# Patient Record
Sex: FEMALE | Race: WHITE | NOT HISPANIC OR LATINO | Employment: FULL TIME | URBAN - METROPOLITAN AREA
[De-identification: names, ages, dates, MRNs, and addresses within clinical notes are randomized per-mention and may not be internally consistent; named-entity substitution may affect disease eponyms.]

---

## 2017-03-13 ENCOUNTER — PATIENT MESSAGE (OUTPATIENT)
Dept: MEDICAL GROUP | Facility: CLINIC | Age: 27
End: 2017-03-13

## 2017-03-13 DIAGNOSIS — L70.8 OTHER ACNE: ICD-10-CM

## 2017-05-08 ENCOUNTER — RX ONLY (OUTPATIENT)
Age: 27
Setting detail: RX ONLY
End: 2017-05-08

## 2017-05-08 PROBLEM — L70.9 ACNE, UNSPECIFIED: Status: ACTIVE | Noted: 2017-05-08

## 2017-11-11 ENCOUNTER — APPOINTMENT (OUTPATIENT)
Dept: RADIOLOGY | Facility: IMAGING CENTER | Age: 27
End: 2017-11-11
Attending: PHYSICIAN ASSISTANT
Payer: COMMERCIAL

## 2017-11-11 ENCOUNTER — OFFICE VISIT (OUTPATIENT)
Dept: URGENT CARE | Facility: CLINIC | Age: 27
End: 2017-11-11
Payer: COMMERCIAL

## 2017-11-11 VITALS
HEART RATE: 65 BPM | OXYGEN SATURATION: 98 % | HEIGHT: 66 IN | RESPIRATION RATE: 18 BRPM | SYSTOLIC BLOOD PRESSURE: 115 MMHG | TEMPERATURE: 98.5 F | BODY MASS INDEX: 23.3 KG/M2 | DIASTOLIC BLOOD PRESSURE: 75 MMHG | WEIGHT: 145 LBS

## 2017-11-11 DIAGNOSIS — S99.922A INJURY OF TOE ON LEFT FOOT, INITIAL ENCOUNTER: ICD-10-CM

## 2017-11-11 PROCEDURE — 73630 X-RAY EXAM OF FOOT: CPT | Mod: TC,LT | Performed by: PHYSICIAN ASSISTANT

## 2017-11-11 PROCEDURE — 99214 OFFICE O/P EST MOD 30 MIN: CPT | Performed by: PHYSICIAN ASSISTANT

## 2017-11-11 ASSESSMENT — ENCOUNTER SYMPTOMS
JOINT SWELLING: 1
NUMBNESS: 0
FOCAL WEAKNESS: 1
WEAKNESS: 1
SENSORY CHANGE: 0

## 2017-11-11 NOTE — PROGRESS NOTES
"Subjective:      Fouzia Saldana is a 27 y.o. female who presents with Other (stubbed left 5th toe last night. has a cut on the tip. brusied can't apply pressure to it. )            Other   This is a new problem. The current episode started yesterday (5th toe inj). The problem occurs constantly. The problem has been unchanged. Associated symptoms include joint swelling and weakness. Pertinent negatives include no numbness. The symptoms are aggravated by bending and walking. She has tried rest for the symptoms. The treatment provided mild relief.       Review of Systems   Musculoskeletal: Positive for joint pain and joint swelling.   Skin: Negative.    Neurological: Positive for focal weakness and weakness. Negative for sensory change and numbness.          Objective:     /75   Pulse 65   Temp 36.9 °C (98.5 °F)   Resp 18   Ht 1.676 m (5' 6\")   Wt 65.8 kg (145 lb)   SpO2 98%   BMI 23.40 kg/m²      Physical Exam   Constitutional: She is oriented to person, place, and time. She appears well-developed and well-nourished. No distress.   Musculoskeletal: She exhibits edema (5th toe tend/swell) and tenderness. She exhibits no deformity.   Neurological: She is alert and oriented to person, place, and time. No sensory deficit. Gait abnormal.   Skin: Skin is warm and dry. No erythema.   Psychiatric: She has a normal mood and affect. Her behavior is normal. Judgment and thought content normal.   Nursing note and vitals reviewed.    Vitals:    11/11/17 1102   BP: 115/75   Pulse: 65   Resp: 18   Temp: 36.9 °C (98.5 °F)   SpO2: 98%   Weight: 65.8 kg (145 lb)   Height: 1.676 m (5' 6\")     Active Ambulatory Problems     Diagnosis Date Noted   • Acne 12/27/2013   • IUD (intrauterine device) in place-MIRENA 03/10/2016     Resolved Ambulatory Problems     Diagnosis Date Noted   • No Resolved Ambulatory Problems     Past Medical History:   Diagnosis Date   • Depression    • Hypertension    • Migraine    • NEGATIVE HISTORY " OF      Current Outpatient Prescriptions on File Prior to Visit   Medication Sig Dispense Refill   • Diclofenac Sodium 1 % Gel Apply  to skin as directed.     • Multiple Vitamin (MULTI-VITAMIN DAILY PO) Take  by mouth.     • levonorgestrel (MIRENA) 20 MCG/24HR IUD 1 Each by Intrauterine route Once.       No current facility-administered medications on file prior to visit.      Gargles, Cepacol lozenges, Aleve/Advil as needed for throat pain  Family History   Problem Relation Age of Onset   • Cancer Maternal Grandmother 75     bone ca   • Lung Disease Maternal Grandmother      copd/smoker   • Cancer Paternal Grandfather      thinks colon ca   • Thyroid Mother      hypo   • Hyperlipidemia Mother    • Hypertension Mother    • Lung Disease Maternal Grandfather      emyphsema/copd.smoker   • Lung Disease Paternal Grandmother      asthma   • Heart Attack Paternal Grandmother      minor silent MI     Review of patient's allergies indicates no known allergies.         Xr= faint steven.dist.phal (read/interpret. By me. Rw)       Assessment/Plan:     ·  5th toe inj      · RICE, nsaids, buddy tape

## 2018-04-24 ENCOUNTER — OFFICE VISIT (OUTPATIENT)
Dept: MEDICAL GROUP | Facility: CLINIC | Age: 28
End: 2018-04-24
Payer: COMMERCIAL

## 2018-04-24 VITALS
WEIGHT: 147 LBS | TEMPERATURE: 97.3 F | HEART RATE: 78 BPM | RESPIRATION RATE: 14 BRPM | OXYGEN SATURATION: 97 % | SYSTOLIC BLOOD PRESSURE: 120 MMHG | DIASTOLIC BLOOD PRESSURE: 78 MMHG | BODY MASS INDEX: 23.63 KG/M2 | HEIGHT: 66 IN

## 2018-04-24 DIAGNOSIS — M25.842 CYST OF JOINT OF HAND, LEFT: ICD-10-CM

## 2018-04-24 PROCEDURE — 99212 OFFICE O/P EST SF 10 MIN: CPT | Performed by: NURSE PRACTITIONER

## 2018-04-24 ASSESSMENT — PATIENT HEALTH QUESTIONNAIRE - PHQ9: CLINICAL INTERPRETATION OF PHQ2 SCORE: 0

## 2018-04-24 NOTE — PROGRESS NOTES
"CC: Cyst (left hand x couple months; only painful when lifting certain ways)        HPI:     Fouzia presents today for the followin. Cyst of joint of hand, left  Here today to be evaluated for a hard bump just above her left hand metatarsal joint. Present for about 2-3 months. Hasn't really noticed that it's growing or getting bigger. No external skin changes. She states in general it doesn't hurt it doesn't bother her however if she is holding a grocery bag her some benefit but sweet and pressure on the tract area that will be somewhat painful temporarily.  She has full motion of her fingers    Current Outpatient Prescriptions   Medication Sig Dispense Refill   • Diclofenac Sodium 1 % Gel Apply  to skin as directed.     • Multiple Vitamin (MULTI-VITAMIN DAILY PO) Take  by mouth.     • levonorgestrel (MIRENA) 20 MCG/24HR IUD 1 Each by Intrauterine route Once.       No current facility-administered medications for this visit.      Social History   Substance Use Topics   • Smoking status: Never Smoker   • Smokeless tobacco: Never Used   • Alcohol use 4.8 oz/week     8 Shots of liquor per week      Comment: 2-4 drinks 1-2 days/week     I reviewed patients allergies, problem list and medications today in [facility].    ROS: Any/all pertinent positives listed in the HPI, otherwise all others reviewed are negative today.      /78   Pulse 78   Temp 36.3 °C (97.3 °F)   Resp 14   Ht 1.676 m (5' 6\")   Wt 66.7 kg (147 lb)   SpO2 97%   Breastfeeding? No   BMI 23.73 kg/m²     Exam:    Gen: Alert and oriented, No apparent distress. WDWN  Psych: A+Ox3, normal affect and mood  Skin: Warm, dry and intact. Good turgor   No rashes in visible areas.  Eye: Conjunctiva clear, lids normal  ENMT: Lips without lesions, good dentition  Neck: No Lymphadenopathy, Thyromegaly, Bruits.   Trachea midline, no masses  Lungs: Clear to auscultation bilaterally, no rales or rhonchi   Unlabored respiratory effort.   CV: Regular rate and " rhythm, S1, S2. No murmurs.   No Edema   Left hand approximately 2-3 mm firm fairly fixed nodule just distal to the metatarsal joint. No external skin changes. No pain with pressure      Assessment and Plan.   28 y.o. female with the following issues.    1. Cyst of joint of hand, left  Stable. Reassurance. Referral to orthopedics for further evaluation.  - REFERRAL TO ORTHOPEDICS

## 2018-07-10 ENCOUNTER — OFFICE VISIT (OUTPATIENT)
Dept: URGENT CARE | Facility: CLINIC | Age: 28
End: 2018-07-10
Payer: COMMERCIAL

## 2018-07-10 VITALS
HEIGHT: 66 IN | DIASTOLIC BLOOD PRESSURE: 78 MMHG | BODY MASS INDEX: 23.63 KG/M2 | WEIGHT: 147 LBS | RESPIRATION RATE: 16 BRPM | OXYGEN SATURATION: 98 % | SYSTOLIC BLOOD PRESSURE: 110 MMHG | HEART RATE: 74 BPM | TEMPERATURE: 98 F

## 2018-07-10 DIAGNOSIS — J02.9 EXUDATIVE PHARYNGITIS: Primary | ICD-10-CM

## 2018-07-10 LAB
INT CON NEG: NEGATIVE
INT CON POS: POSITIVE
S PYO AG THROAT QL: NORMAL

## 2018-07-10 PROCEDURE — 87880 STREP A ASSAY W/OPTIC: CPT | Performed by: PHYSICIAN ASSISTANT

## 2018-07-10 PROCEDURE — 99214 OFFICE O/P EST MOD 30 MIN: CPT | Performed by: PHYSICIAN ASSISTANT

## 2018-07-10 RX ORDER — AMOXICILLIN AND CLAVULANATE POTASSIUM 875; 125 MG/1; MG/1
1 TABLET, FILM COATED ORAL 2 TIMES DAILY
Qty: 14 TAB | Refills: 0 | Status: SHIPPED | OUTPATIENT
Start: 2018-07-10 | End: 2018-07-17

## 2018-07-10 NOTE — PROGRESS NOTES
Subjective:      Pt is a 28 y.o. female who presents with Pharyngitis (x 2 days, hurt to swallow, lymph nodes swollen, fever, chills)            HPI  PT presents to  clinic today complaining of sore throat,  pressure in ears, cough, fatigue, runny nose. PT denies CP, SOB, NVD, abdominal pain, joint pain. PT states these symptoms began around 2 days ago. PT states the pain is a 7/10 with swallowing, aching in nature and worse at night. . Pt has not taken any RX medications for this condition. The pt's medication list, problem list, and allergies have been evaluated and reviewed during today's visit.      PMH:  Past Medical History:   Diagnosis Date   • Depression     had some depression situationally unemployed, soped, self-resolved   • Hypertension     dentist/doctor appts was told 150s/90s   • Migraine     hx migraines (on imitrex) in her teens, resolved in her 20s   • NEGATIVE HISTORY OF        PSH:  Past Surgical History:   Procedure Laterality Date   • DENTAL EXTRACTION(S)      wisdom       Fam Hx:    family history includes Cancer in her paternal grandfather; Cancer (age of onset: 75) in her maternal grandmother; Heart Attack in her paternal grandmother; Hyperlipidemia in her mother; Hypertension in her mother; Lung Disease in her maternal grandfather, maternal grandmother, and paternal grandmother; Thyroid in her mother.  Family Status   Relation Status   • Maternal Grandmother    • Paternal Grandfather    • Mother Alive   • Maternal Grandfather    • Paternal Grandmother Alive   • Father Alive   • Brother Alive       Soc HX:  Social History     Social History   • Marital status: Single     Spouse name: N/A   • Number of children: N/A   • Years of education: N/A     Occupational History   • Not on file.     Social History Main Topics   • Smoking status: Never Smoker   • Smokeless tobacco: Never Used   • Alcohol use 4.8 oz/week     8 Shots of liquor per week      Comment: 2-4 drinks 1-2  "days/week   • Drug use: No   • Sexual activity: Yes     Partners: Male     Birth control/ protection: IUD      Comment: Mirena-2011     Other Topics Concern   • Not on file     Social History Narrative   • No narrative on file         Medications:    Current Outpatient Prescriptions:   •  amoxicillin-clavulanate (AUGMENTIN) 875-125 MG Tab, Take 1 Tab by mouth 2 times a day for 7 days., Disp: 14 Tab, Rfl: 0  •  Diclofenac Sodium 1 % Gel, Apply  to skin as directed., Disp: , Rfl:   •  Multiple Vitamin (MULTI-VITAMIN DAILY PO), Take  by mouth., Disp: , Rfl:   •  levonorgestrel (MIRENA) 20 MCG/24HR IUD, 1 Each by Intrauterine route Once., Disp: , Rfl:       Allergies:  Patient has no known allergies.    ROS  Constitutional: Positive for  malaise/fatigue.   HENT: Positive for congestion and sore throat. Negative for ear pain.    Eyes: Negative for blurred vision, double vision and photophobia.   Respiratory: Negative for cough, shortness of breath and wheezing.    Cardiovascular: Negative for chest pain and palpitations.   Gastrointestinal: Negative for nausea, vomiting, abdominal pain, diarrhea and constipation.   Genitourinary: Negative for dysuria and flank pain.   Musculoskeletal: Negative for falls and myalgias.   Skin: Negative for itching and rash.   Neurological:  Negative for dizziness and tingling.   Endo/Heme/Allergies: Does not bruise/bleed easily.   Psychiatric/Behavioral: Negative for depression. The patient is not nervous/anxious.             Objective:     /78   Pulse 74   Temp 36.7 °C (98 °F)   Resp 16   Ht 1.676 m (5' 6\")   Wt 66.7 kg (147 lb)   SpO2 98%   BMI 23.73 kg/m²      Physical Exam   Constitutional: PT is oriented to person, place, and time. PT appears well-developed and well-nourished. No distress.   HENT:   Head: Normocephalic and atraumatic.   Right Ear: Hearing, tympanic membrane, external ear and ear canal normal.   Left Ear: Hearing, tympanic membrane, external ear and ear " canal normal.   Nose: Mucosal edema, rhinorrhea and sinus tenderness present. Right sinus exhibits frontal sinus tenderness. Left sinus exhibits frontal sinus tenderness.   Mouth/Throat: Uvula is midline. Mucous membranes are pale. Posterior oropharyngeal edema and posterior oropharyngeal erythema with exudate noted on exam.   Eyes: Conjunctivae normal and EOM are normal. Pupils are equal, round, and reactive to light.   Neck: Normal range of motion. Neck supple. No thyromegaly present.   Cardiovascular: Normal rate, regular rhythm, normal heart sounds and intact distal pulses.  Exam reveals no gallop and no friction rub.    No murmur heard.  Pulmonary/Chest: Effort normal and breath sounds normal. No respiratory distress. PT has no wheezes. PT has no rales. PT exhibits no tenderness.   Abdominal: Soft. Bowel sounds are normal. PT exhibits no distension and no mass. There is no tenderness. There is no rebound and no guarding.   Musculoskeletal: Normal range of motion. PT exhibits no edema and no tenderness.   Lymphadenopathy:     PT has no cervical adenopathy.   Neurological: PT is alert and oriented to person, place, and time. PT displays normal reflexes. No cranial nerve deficit. PT exhibits normal muscle tone. Coordination normal.   Skin: Skin is warm and dry. No rash noted. No erythema.   Psychiatric: PT has a normal mood and affect. PT behavior is normal. Judgment and thought content normal.               Assessment/Plan:     1. Exudative pharyngitis  Back-up abx if symptoms do not improve in 2-3 days. PT on clinical presentation has exudate on oropharynx and it's possible beyond the strep A testing that this could be a different type of strep (C/G) or A. haemolyticum     - amoxicillin-clavulanate (AUGMENTIN) 875-125 MG Tab; Take 1 Tab by mouth 2 times a day for 7 days.  Dispense: 14 Tab; Refill: 0  - POCT Rapid Strep A-->NEG    Rest, fluids encouraged.  OTC decongestant for congestion  AVS with medical info  given.  Pt was in full understanding and agreement with the plan.  Follow-up as needed if symptoms worsen or fail to improve.

## 2019-02-08 ENCOUNTER — OFFICE VISIT (OUTPATIENT)
Dept: MEDICAL GROUP | Facility: PHYSICIAN GROUP | Age: 29
End: 2019-02-08
Payer: COMMERCIAL

## 2019-02-08 VITALS
DIASTOLIC BLOOD PRESSURE: 64 MMHG | RESPIRATION RATE: 14 BRPM | WEIGHT: 150 LBS | BODY MASS INDEX: 24.11 KG/M2 | HEIGHT: 66 IN | OXYGEN SATURATION: 96 % | HEART RATE: 62 BPM | SYSTOLIC BLOOD PRESSURE: 110 MMHG | TEMPERATURE: 98.1 F

## 2019-02-08 DIAGNOSIS — F32.9 REACTIVE DEPRESSION (SITUATIONAL): ICD-10-CM

## 2019-02-08 PROCEDURE — 99214 OFFICE O/P EST MOD 30 MIN: CPT | Performed by: FAMILY MEDICINE

## 2019-02-08 ASSESSMENT — PATIENT HEALTH QUESTIONNAIRE - PHQ9
SUM OF ALL RESPONSES TO PHQ QUESTIONS 1-9: 9
5. POOR APPETITE OR OVEREATING: 0 - NOT AT ALL
CLINICAL INTERPRETATION OF PHQ2 SCORE: 3

## 2019-02-09 NOTE — PROGRESS NOTES
"Fouzia Saldana is a 28 y.o. female here to establish care and discuss depression.    HPI:  Fouzia is a pleasant 28-year-old female here to establish care.  Her previous PCP was ROB Morocho.  She is an  and works for the city of [place].  Her  (Huy) is also a patient of mine.    Her main concern today is depression.  She recently found out that her  has been abusing narcotic pain medication.  He has been buying it from an acquaintance.  He is in a methadone program now.  When I ask her how she is doing, she tells me, \"I'm not doing well.\"  She recently established with a therapist who recommended seeing a physician to discuss going on an anti-depressant.  She denies issues sleeping or eating.  Work has been stressful as well with an influx of cases.  We discussed her depression screen.  She has felt depressed in the past, while being unemployed.  Has never been on medication.  Denies suicidal or homicidal thoughts.    Depression Screening  Little interest or pleasure in doing things?  0 - not at all   Feeling down, depressed , or hopeless? 3 - nearly every day   Trouble falling or staying asleep, or sleeping too much?  1 - several days   Feeling tired or having little energy?  1 - several days   Poor appetite or overeating?  0 - not at all   Feeling bad about yourself - or that you are a failure or have let yourself or your family down? 2 - more than half the days   Trouble concentrating on things, such as reading the newspaper or watching television? 2 - more than half the days   Moving or speaking so slowly that other people could have noticed.  Or the opposite - being so fidgety or restless that you have been moving around a lot more than usual?  0 - not at all   Thoughts that you would be better off dead, or of hurting yourself?  0 - not at all   Patient Health Questionnaire Score: 9     Current medicines (including changes today)  Current Outpatient Prescriptions   Medication " Sig Dispense Refill   • sertraline (ZOLOFT) 50 MG Tab Take 1/2 tablet PO daily x2 weeks, then increase to 1 tablet PO daily. 30 Tab 2   • Multiple Vitamin (MULTI-VITAMIN DAILY PO) Take  by mouth.     • levonorgestrel (MIRENA) 20 MCG/24HR IUD 1 Each by Intrauterine route Once.     • Diclofenac Sodium 1 % Gel Apply  to skin as directed.       No current facility-administered medications for this visit.      She  has a past medical history of Depression; Hypertension; and Migraine.  She  has a past surgical history that includes dental extraction(s).  Social History   Substance Use Topics   • Smoking status: Never Smoker   • Smokeless tobacco: Never Used   • Alcohol use Yes     Social History     Social History Narrative   • No narrative on file     Family History   Problem Relation Age of Onset   • Cancer Maternal Grandmother 75        bone ca   • Lung Disease Maternal Grandmother         copd/smoker   • Cancer Paternal Grandfather         thinks colon ca   • Thyroid Mother         hypo   • Hyperlipidemia Mother    • Hypertension Mother    • Lung Disease Maternal Grandfather         emyphsema/copd.smoker   • Lung Disease Paternal Grandmother         asthma   • Heart Attack Paternal Grandmother         minor silent MI     Family Status   Relation Status   • MGMo    • PGFa    • Mo Alive   • MGFa    • PGMo Alive   • Fa Alive   • Bro Alive     ROS  Constitutional: Negative for fever, chills and malaise/fatigue.   HENT: Negative for congestion.    Eyes: Negative for pain.   Respiratory: Negative for cough and shortness of breath.    Cardiovascular: Negative for leg swelling.   Gastrointestinal: Negative for nausea, vomiting, abdominal pain and diarrhea.   Genitourinary: Negative for dysuria and hematuria.   Skin: Negative for rash.   Neurological: Negative for dizziness, focal weakness and headaches.   Endo/Heme/Allergies: Does not bruise/bleed easily.   Psychiatric/Behavioral: Positive for  "depression.      Objective:     Physical Exam:  Blood pressure 110/64, pulse 62, temperature 36.7 °C (98.1 °F), resp. rate 14, height 1.676 m (5' 6\"), weight 68 kg (150 lb), SpO2 96 %, not currently breastfeeding. Body mass index is 24.21 kg/m².  Constitutional: Alert, no distress.  Skin: Warm, dry, good turgor, no rashes in visible areas.  Eye: Equal, round and reactive, conjunctiva clear, lids normal.  ENMT: Lips without lesions, good dentition, moist mucous membranes.  Neck: Trachea midline, no masses, no thyromegaly.  Respiratory: Unlabored respiratory effort, no cough.  MSK: Normal gait, moves all extremities.  Psych: Alert and oriented x3, normal affect and mood. Intermittently teary-eyed during her appointment.    Assessment and Plan:     1. Reactive depression (situational)  This is a new problem.  Significant increase in stress both at home and at work.  Her  is in treatment for Opioid Use Disorder.  We had a lengthy discussion regarding treatment options for depression.  Counseling provided.  We agreed on a trial of low-dose sertraline.  Possible adverse effects discussed.  She will have close follow-up.  - sertraline (ZOLOFT) 50 MG Tab; Take 1/2 tablet PO daily x2 weeks, then increase to 1 tablet PO daily.  Dispense: 30 Tab; Refill: 2    Records reviewed in Epic  Followup: 4-6 weeks, sooner if needed         PLEASE NOTE: This dictation was created using voice recognition software. I have made every reasonable attempt to correct obvious errors, but I expect that there are errors of grammar and possibly content that I did not discover before finalizing the note.  "

## 2019-03-20 ENCOUNTER — OFFICE VISIT (OUTPATIENT)
Dept: MEDICAL GROUP | Facility: PHYSICIAN GROUP | Age: 29
End: 2019-03-20
Payer: COMMERCIAL

## 2019-03-20 VITALS
TEMPERATURE: 97.8 F | BODY MASS INDEX: 24.43 KG/M2 | HEART RATE: 72 BPM | OXYGEN SATURATION: 93 % | RESPIRATION RATE: 12 BRPM | HEIGHT: 66 IN | WEIGHT: 152 LBS

## 2019-03-20 DIAGNOSIS — F32.9 REACTIVE DEPRESSION (SITUATIONAL): ICD-10-CM

## 2019-03-20 PROCEDURE — 99213 OFFICE O/P EST LOW 20 MIN: CPT | Performed by: FAMILY MEDICINE

## 2019-03-20 ASSESSMENT — PATIENT HEALTH QUESTIONNAIRE - PHQ9
CLINICAL INTERPRETATION OF PHQ2 SCORE: 2
5. POOR APPETITE OR OVEREATING: 0 - NOT AT ALL
SUM OF ALL RESPONSES TO PHQ QUESTIONS 1-9: 8

## 2019-03-20 NOTE — PROGRESS NOTES
Subjective:   Fouzia Saldana is a 28 y.o. female here today for follow-up depression. She is unaccompanied for today's appointment.     Reactive depression  Patient tells me that she is feeling better. She has been treating her depression with Zoloft. She recently increased her dose to 50 mg daily. Her depression stems from her husbands abuse of narcotic pain medication and other stressors in her life. She continues to meet with her therapist. Her work schedule has slowed down recently and she feels more in control of her depression. She is sleeping well recently but is having some difficulty waking in the morning. She was feeling tired in the afternoon so she switched her Zoloft dose to just before bed. She is eating normally and has been working on taking time for herself. She is complaining of an associated acne exacerbation which generally happens with stress and she has used topical medications in the past.    Her , Huy, has relapsed on narcotics twice since the last office visit but is continuing to follow up with [facility] for treatment. He is considering starting Suboxone. He will be telling about his family about his addiction problems this week.     Current medicines (including changes today)  Current Outpatient Prescriptions   Medication Sig Dispense Refill   • sertraline (ZOLOFT) 50 MG Tab Take 1/2 tablet PO daily x2 weeks, then increase to 1 tablet PO daily. 30 Tab 2   • Multiple Vitamin (MULTI-VITAMIN DAILY PO) Take  by mouth.     • levonorgestrel (MIRENA) 20 MCG/24HR IUD 1 Each by Intrauterine route Once.     • Diclofenac Sodium 1 % Gel Apply  to skin as directed.       No current facility-administered medications for this visit.      She  has a past medical history of Depression; Hypertension; and Migraine.    ROS   No chest pain, no shortness of breath, no abdominal pain.     Objective:     Physical Exam:  Pulse 72, temperature 36.6 °C (97.8 °F), resp. rate 12, height 1.676 m  "(5' 6\"), weight 68.9 kg (152 lb), SpO2 93 %, not currently breastfeeding. Body mass index is 24.53 kg/m².   Constitutional: Alert, no distress, well-groomed.  Skin: Warm, dry, good turgor, no rashes in visible areas.  Eye: Equal, round and reactive, conjunctiva clear, lids normal.  ENMT: Lips without lesions, good dentition, moist mucous membranes.  Neck: Trachea midline, no masses, no thyromegaly.  Respiratory: Unlabored respiratory effort, no cough.  Abdomen: Soft, no gross masses.  MSK: Normal gait, moves all extremities.  Neuro: Grossly non-focal. No cranial nerve deficit. Strength and sensation intact.   Psych: Alert and oriented x3, normal affect and mood.    Assessment and Plan:     1. Reactive depression (situational)  Improving. Patient reports moderate improvement since increasing her Zoloft dose to 50 mg daily. She is making advances towards controlling her depression and discussed possibly lowering her dose in the future if she feels comfortable. Discussed the importance of taking time for herself while being supportive of her . Encouraged her to continue therapy. Will continue to follow up and she will follow up in 10 weeks.     Followup: Return in about 10 weeks (around 5/29/2019) for f/u sertraline medication, short.          Willem ARDON (Scribe), am scribing for, and in the presence of, Teresita Brown MD    Electronically signed by: Willem Quintanilla (Scribe), 3/20/2019    Teresita ARODN MD personally performed the services described in this documentation, as scribed by Willem Quintanilla in my presence, and it is both accurate and complete.    "

## 2019-05-13 DIAGNOSIS — F32.9 REACTIVE DEPRESSION (SITUATIONAL): ICD-10-CM

## 2019-05-29 ENCOUNTER — APPOINTMENT (OUTPATIENT)
Dept: MEDICAL GROUP | Facility: PHYSICIAN GROUP | Age: 29
End: 2019-05-29
Payer: COMMERCIAL

## 2019-08-16 ENCOUNTER — OFFICE VISIT (OUTPATIENT)
Dept: MEDICAL GROUP | Facility: PHYSICIAN GROUP | Age: 29
End: 2019-08-16
Payer: COMMERCIAL

## 2019-08-16 VITALS
WEIGHT: 151 LBS | BODY MASS INDEX: 24.27 KG/M2 | TEMPERATURE: 97.9 F | OXYGEN SATURATION: 98 % | RESPIRATION RATE: 18 BRPM | HEIGHT: 66 IN | DIASTOLIC BLOOD PRESSURE: 82 MMHG | SYSTOLIC BLOOD PRESSURE: 122 MMHG | HEART RATE: 80 BPM

## 2019-08-16 DIAGNOSIS — M76.31 IT BAND SYNDROME, RIGHT: ICD-10-CM

## 2019-08-16 DIAGNOSIS — F32.9 REACTIVE DEPRESSION: ICD-10-CM

## 2019-08-16 DIAGNOSIS — F32.1 CURRENT MODERATE EPISODE OF MAJOR DEPRESSIVE DISORDER WITHOUT PRIOR EPISODE (HCC): ICD-10-CM

## 2019-08-16 DIAGNOSIS — M70.61 TROCHANTERIC BURSITIS OF RIGHT HIP: ICD-10-CM

## 2019-08-16 PROCEDURE — 99214 OFFICE O/P EST MOD 30 MIN: CPT | Performed by: FAMILY MEDICINE

## 2019-08-16 SDOH — HEALTH STABILITY: MENTAL HEALTH: HOW MANY STANDARD DRINKS CONTAINING ALCOHOL DO YOU HAVE ON A TYPICAL DAY?: 3 OR 4

## 2019-08-16 SDOH — HEALTH STABILITY: MENTAL HEALTH: HOW OFTEN DO YOU HAVE 6 OR MORE DRINKS ON ONE OCCASION?: MONTHLY

## 2019-08-16 SDOH — HEALTH STABILITY: MENTAL HEALTH: HOW OFTEN DO YOU HAVE A DRINK CONTAINING ALCOHOL?: 2-3 TIMES A WEEK

## 2019-08-16 NOTE — PROGRESS NOTES
"Subjective:   Fouzia Saldana is a 29 y.o. female here today for follow-up depression.  She is unaccompanied for today's visit.     Fouzia tells me that she is feeling \"okay\" today.  She admits that she has had some periods of \"lows\" a few weeks ago.  During this time, she did have passive suicidal thoughts.  She denies any active SI or HI today.  She continues to be supportive of her  who is going through treatment for opioid use disorder.  She has noticed that when he has a relapse, she feels down.  The sertraline medication has been very helpful for her and she would like to continue it.  She is meeting regularly with her counselor.  She tells me that she has not been good about exercising or spending time with friends as she worries about her .    She also mentions that she has been having some stiffness in and around her right hip.  This is new to me and has been ongoing for the last several weeks.  No injury or trauma.  She has been less physically active than she usually is.  Today, she mainly has stiffness in her right quadriceps muscle.  No issues walking.  No associated numbness, tingling or muscle weakness.    Current medicines (including changes today)  Current Outpatient Medications   Medication Sig Dispense Refill   • sertraline (ZOLOFT) 50 MG Tab TAKE 1/2 TABLET BY MOUTH ONCE DAILY FOR 2 WEEKS, THEN INCREASE TO 1 TABLET ONCE DAILY 90 Tab 1   • Diclofenac Sodium 1 % Gel Apply  to skin as directed.     • Multiple Vitamin (MULTI-VITAMIN DAILY PO) Take  by mouth.     • levonorgestrel (MIRENA) 20 MCG/24HR IUD 1 Each by Intrauterine route Once.       No current facility-administered medications for this visit.      She  has a past medical history of Depression, Hypertension, and Migraine.    ROS   No fever, no chest pain, no shortness of breath, no abdominal pain. No SI/HI.     Objective:     Physical Exam:  /82 (BP Location: Left arm, Patient Position: Sitting, BP Cuff Size: Adult)   " "Pulse 80   Temp 36.6 °C (97.9 °F) (Temporal)   Resp 18   Ht 1.676 m (5' 6\")   Wt 68.5 kg (151 lb)   SpO2 98%  Body mass index is 24.37 kg/m².   Constitutional: Alert, no distress, well-groomed.  Skin: Warm, dry, good turgor, no rashes in visible areas.  Eye: Equal, round and reactive, conjunctiva clear, lids normal.  ENMT: Lips without lesions, good dentition, moist mucous membranes.  Neck: Trachea midline, no masses, no thyromegaly.  Respiratory: Unlabored respiratory effort, no cough.  Abdomen: Soft, no gross masses.  MSK: Normal gait, moves all extremities.   Hip: Tenderness to palpation along proximal right quadriceps muscle, right IT band and right greater trochanteric bursitis. Normal flexion, extension, abduction and adduction ROM. No pain with axial load or internal rotation. Negative FADDIR. Negative SLR.  Neuro: Grossly non-focal. No cranial nerve deficit. Strength and sensation intact.   Psych: Alert and oriented x3, slightly dull affect.    Assessment and Plan:     1. Reactive depression  2. Current moderate episode of major depressive disorder without prior episode (HCC)  Relatively stable, but with some acute exacerbations of depressed mood and passive suicidal ideations.  She denies crisis today.  There is a significant amount of stress at home.  Encouraged her to continue seeing her counselor and set aside time for herself.  We agreed to continue her current dose of sertraline and meet again in three months.    3. It band syndrome, right  4. Trochanteric bursitis of right hip  New.  Differentials include quadriceps strain, IT band syndrome and trochanteric bursitis.  Home exercises and NSAIDs discussed.  Return precautions given.  May consider referral to Sports Medicine if worsening.    Followup: Return in about 3 months (around 11/16/2019) for f/u depression, short.          Jason ARDON (Scribe), am scribing for, and in the presence of, Teresita Brown MD    Electronically signed by: " Jason Finch (Scribe), 8/16/2019    ITeresita MD personally performed the services described in this documentation, as scribed by Jason Finch in my presence, and it is both accurate and complete.

## 2019-11-18 ENCOUNTER — OFFICE VISIT (OUTPATIENT)
Dept: MEDICAL GROUP | Facility: PHYSICIAN GROUP | Age: 29
End: 2019-11-18
Payer: COMMERCIAL

## 2019-11-18 VITALS
TEMPERATURE: 98.6 F | SYSTOLIC BLOOD PRESSURE: 108 MMHG | WEIGHT: 152.4 LBS | HEART RATE: 66 BPM | DIASTOLIC BLOOD PRESSURE: 74 MMHG | HEIGHT: 66 IN | OXYGEN SATURATION: 98 % | RESPIRATION RATE: 16 BRPM | BODY MASS INDEX: 24.49 KG/M2

## 2019-11-18 DIAGNOSIS — F32.9 REACTIVE DEPRESSION (SITUATIONAL): ICD-10-CM

## 2019-11-18 PROCEDURE — 99214 OFFICE O/P EST MOD 30 MIN: CPT | Performed by: FAMILY MEDICINE

## 2019-11-19 NOTE — PROGRESS NOTES
"Subjective:   Fouzia Saldana is a 29 y.o. female here today for follow-up depression.  She is unaccompanied for today's appointment today.    Fouzia tells me that she is doing \"okay.\"  She continues to meet with her therapist and take her sertraline medication.  She denies missed days or adverse effects.  She feels well on it and would like to continue it for the next few months.  She mentions that her  is doing well in his treatment program.  He did unfortunately have a relapse and overdosed at home.  Fouzia needed to administer Narcan nasal spray to him.  Since that time, he has been very motivated at his program and has been attending daily therapy sessions.  Work is going well and she denies any new stressors.  She is excited for the holidays.  Sleeping well and taking time for herself.    Current medicines (including changes today)  Current Outpatient Medications   Medication Sig Dispense Refill   • sertraline (ZOLOFT) 50 MG Tab Take 1 Tab by mouth every day. 90 Tab 1   • Diclofenac Sodium 1 % Gel Apply  to skin as directed.     • Multiple Vitamin (MULTI-VITAMIN DAILY PO) Take  by mouth.     • levonorgestrel (MIRENA) 20 MCG/24HR IUD 1 Each by Intrauterine route Once.       No current facility-administered medications for this visit.      She  has a past medical history of Depression, Hypertension, and Migraine.    ROS   No fatigue, no fever, no shortness of breath, no abdominal pain, no suicidal thoughts.     Objective:     Physical Exam:  /74 (BP Location: Left arm, Patient Position: Sitting, BP Cuff Size: Adult)   Pulse 66   Temp 37 °C (98.6 °F) (Temporal)   Resp 16   Ht 1.676 m (5' 6\")   Wt 69.1 kg (152 lb 6.4 oz)   SpO2 98%  Body mass index is 24.6 kg/m².   Constitutional: Alert, no distress, well-groomed.  Skin: Warm, dry, good turgor, no rashes in visible areas.  Eye: Equal, round and reactive, conjunctiva clear, lids normal.  ENMT: Lips without lesions, good dentition, moist mucous " membranes.  Neck: Trachea midline, no masses, no thyromegaly.  Respiratory: Unlabored respiratory effort, no cough.  Abdomen: Soft, no gross masses.  MSK: Normal gait, moves all extremities.  Neuro: Grossly non-focal. No cranial nerve deficit. Strength and sensation intact.   Psych: Alert and oriented x3, normal affect and mood.    Assessment and Plan:     1. Reactive depression (situational)  Patient is feeling well on current medications.  Will continue and refills ordered.  Denies any suicidal or homicidal ideation.  Emphasized importance of healthy diet and exercise.  Discussed that should the patient have any symptoms they should call suicide prevention hotline or report to the emergency room immediately.    She will return next year for annual exam.  We will re-evaluate her anti-depressant at that time.  I let her know she may send me a [employer] message at anytime should she have questions or wish to taper off sooner.    - sertraline (ZOLOFT) 50 MG Tab; Take 1 Tab by mouth every day.  Dispense: 90 Tab; Refill: 1    Followup: Return in about 6 months (around 5/18/2020) for Annual, PAP, check in on medicine, short.         PLEASE NOTE: This dictation was created using voice recognition software. I have made every reasonable attempt to correct obvious errors, but I expect that there are errors of grammar and possibly content that I did not discover before finalizing the note.

## 2020-03-02 ENCOUNTER — OFFICE VISIT (OUTPATIENT)
Dept: MEDICAL GROUP | Facility: PHYSICIAN GROUP | Age: 30
End: 2020-03-02
Payer: COMMERCIAL

## 2020-03-02 VITALS
HEART RATE: 70 BPM | DIASTOLIC BLOOD PRESSURE: 66 MMHG | OXYGEN SATURATION: 94 % | SYSTOLIC BLOOD PRESSURE: 126 MMHG | HEIGHT: 66 IN | WEIGHT: 150 LBS | BODY MASS INDEX: 24.11 KG/M2 | RESPIRATION RATE: 16 BRPM | TEMPERATURE: 98.8 F

## 2020-03-02 DIAGNOSIS — F32.1 CURRENT MODERATE EPISODE OF MAJOR DEPRESSIVE DISORDER WITHOUT PRIOR EPISODE (HCC): ICD-10-CM

## 2020-03-02 PROCEDURE — 99214 OFFICE O/P EST MOD 30 MIN: CPT | Performed by: FAMILY MEDICINE

## 2020-03-02 RX ORDER — SERTRALINE HYDROCHLORIDE 100 MG/1
75 TABLET, FILM COATED ORAL DAILY
Qty: 1 TAB | Refills: 0
Start: 2020-03-02 | End: 2020-04-30

## 2020-03-03 NOTE — PROGRESS NOTES
"Subjective:   Fouzia Saldana is a 29 y.o. female here today for follow-up depression and medication management.  She is accompanied by her , Huy, for today's appointment.    Over the last several months, Fouzia reports that she has had periods of time where she feels very \"down\" and \"sad.\"  Her  describes her crying more and not wanting to spend time with friends and family.  These episodes will last for a few days and then she will be back to her normal self.  She has been on sertraline 50mg for several months.  This was first started around the time her  began treatment for opioid use disorder.  She has felt well on the medication, but wonders if it is no longer working as well as it used to do.  Denies adverse effects.  No issues sleeping.  Appetite is good. Denies suicidal or homicidal thoughts. No lio.    Current medicines (including changes today)  Current Outpatient Medications   Medication Sig Dispense Refill   • sertraline (ZOLOFT) 100 MG Tab Take 1 Tab by mouth every day. 1 Tab 0   • Diclofenac Sodium 1 % Gel Apply  to skin as directed.     • Multiple Vitamin (MULTI-VITAMIN DAILY PO) Take  by mouth.     • levonorgestrel (MIRENA) 20 MCG/24HR IUD 1 Each by Intrauterine route Once.       No current facility-administered medications for this visit.      She  has a past medical history of Depression, Hypertension, and Migraine.    ROS   No chest pain, no shortness of breath, no abdominal pain.     Objective:     Physical Exam:  /66 (BP Location: Right arm, Patient Position: Sitting, BP Cuff Size: Adult)   Pulse 70   Temp 37.1 °C (98.8 °F) (Temporal)   Resp 16   Ht 1.676 m (5' 6\")   Wt 68 kg (150 lb)   SpO2 94%  Body mass index is 24.21 kg/m².   Constitutional: Alert, no distress, well-groomed.  Skin: Warm, dry, good turgor, no rashes in visible areas.  Eye: Equal, round and reactive, conjunctiva clear, lids normal.  ENMT: Lips without lesions, good dentition, moist mucous " membranes.  Neck: Trachea midline, no masses, no thyromegaly.  Respiratory: Unlabored respiratory effort, no cough.  Abdomen: Soft, no gross masses.  MSK: Normal gait, moves all extremities.  Neuro: Grossly non-focal. No cranial nerve deficit. Strength and sensation intact.   Psych: Alert and oriented x3, normal affect and mood.    Assessment and Plan:     1. Current moderate episode of major depressive disorder without prior episode (HCC)  Worsening.  We discussed that her clinical presentation is now more consistent with Major Depressive Disorder now.  Symptoms are no longer well controlled with sertraline 50mg.  Will increase dose to 75mg.  May consider increasing further to 100mg if needed.  Encouraged her to consider increasing frequency of therapy appointments in the short-term.  Denies crisis.  Will monitor closely.  - sertraline (ZOLOFT) 100 MG Tab; Take 3/4 Tab by mouth every day.  Dispense: 1 Tab; Refill: 0    Followup: 6 weeks         PLEASE NOTE: This dictation was created using voice recognition software. I have made every reasonable attempt to correct obvious errors, but I expect that there are errors of grammar and possibly content that I did not discover before finalizing the note.

## 2020-04-30 ENCOUNTER — PATIENT MESSAGE (OUTPATIENT)
Dept: MEDICAL GROUP | Facility: PHYSICIAN GROUP | Age: 30
End: 2020-04-30

## 2020-04-30 RX ORDER — SERTRALINE HYDROCHLORIDE 25 MG/1
25 TABLET, FILM COATED ORAL DAILY
Qty: 30 TAB | Refills: 11 | Status: SHIPPED | OUTPATIENT
Start: 2020-04-30 | End: 2020-07-08 | Stop reason: SDUPTHER

## 2020-07-08 ENCOUNTER — HOSPITAL ENCOUNTER (OUTPATIENT)
Facility: MEDICAL CENTER | Age: 30
End: 2020-07-08
Attending: FAMILY MEDICINE
Payer: COMMERCIAL

## 2020-07-08 ENCOUNTER — OFFICE VISIT (OUTPATIENT)
Dept: MEDICAL GROUP | Facility: PHYSICIAN GROUP | Age: 30
End: 2020-07-08
Payer: COMMERCIAL

## 2020-07-08 VITALS
HEIGHT: 66 IN | BODY MASS INDEX: 23.78 KG/M2 | RESPIRATION RATE: 16 BRPM | TEMPERATURE: 98 F | DIASTOLIC BLOOD PRESSURE: 78 MMHG | HEART RATE: 72 BPM | WEIGHT: 148 LBS | SYSTOLIC BLOOD PRESSURE: 118 MMHG | OXYGEN SATURATION: 98 %

## 2020-07-08 DIAGNOSIS — Z12.4 CERVICAL CANCER SCREENING: ICD-10-CM

## 2020-07-08 DIAGNOSIS — F32.5 MAJOR DEPRESSIVE DISORDER WITH SINGLE EPISODE, IN FULL REMISSION (HCC): ICD-10-CM

## 2020-07-08 DIAGNOSIS — Z11.51 SCREENING FOR HPV (HUMAN PAPILLOMAVIRUS): ICD-10-CM

## 2020-07-08 DIAGNOSIS — Z01.419 WELL WOMAN EXAM: ICD-10-CM

## 2020-07-08 PROCEDURE — 99395 PREV VISIT EST AGE 18-39: CPT | Performed by: FAMILY MEDICINE

## 2020-07-08 PROCEDURE — 88175 CYTOPATH C/V AUTO FLUID REDO: CPT

## 2020-07-08 PROCEDURE — 87624 HPV HI-RISK TYP POOLED RSLT: CPT

## 2020-07-08 RX ORDER — SERTRALINE HYDROCHLORIDE 25 MG/1
25 TABLET, FILM COATED ORAL DAILY
Qty: 90 TAB | Refills: 3 | Status: SHIPPED | OUTPATIENT
Start: 2020-07-08 | End: 2021-09-01 | Stop reason: SDUPTHER

## 2020-07-08 NOTE — PROGRESS NOTES
Subjective:     CC:   Chief Complaint   Patient presents with   • Annual Exam     PAP     HPI:   Fouzia Saldana is a 30 y.o. female who presents for annual exam.    Patient has GYN provider: No   Last Pap Smear: [id number]   H/O Abnormal Pap: Yes, 10 years ago  Last Mammogram: N/A  Last Bone Density Test: N/A  Last Colorectal Cancer Screening: N/A  Last Tdap: 2016  Received HPV series: Patient does not recall    Exercise: strenuous regular exercise, aerobic > 3 hours a week  Diet: healthy      No LMP recorded. Patient has had an implant.  Hx STDs: No  Birth control: Mirena IUD  Menses every month with 1-2 days with spotting bleeding.  Denies cramping.  No significant bloating/fluid retention, pelvic pain, or dyspareunia. No abnormal vaginal discharge.  No breast tenderness, mass, nipple discharge, changes in size or contour, or abnormal cyclic discomfort.    OB History    Para Term  AB Living   0 0 0 0 0 0   SAB TAB Ectopic Molar Multiple Live Births   0 0 0 0 0 0      She  reports being sexually active and has had partner(s) who are Male. She reports using the following method of birth control/protection: I.U.D..    She  has a past medical history of Depression, Hypertension, and Migraine.  She  has a past surgical history that includes dental extraction(s).    Family History   Problem Relation Age of Onset   • Cancer Maternal Grandmother 75        bone ca   • Lung Disease Maternal Grandmother         copd/smoker   • Cancer Paternal Grandfather         thinks colon ca   • Thyroid Mother         hypo   • Hyperlipidemia Mother    • Hypertension Mother    • Lung Disease Maternal Grandfather         emyphsema/copd.smoker   • Lung Disease Paternal Grandmother         asthma   • Heart Attack Paternal Grandmother         minor silent MI     Social History     Tobacco Use   • Smoking status: Never Smoker   • Smokeless tobacco: Never Used   Substance Use Topics   • Alcohol use: Yes     Frequency: 2-3 times a  "week     Drinks per session: 3 or 4     Binge frequency: Monthly   • Drug use: No       Patient Active Problem List    Diagnosis Date Noted   • Current moderate episode of major depressive disorder without prior episode (HCC) 08/16/2019   • Acne 12/27/2013     Current Outpatient Medications   Medication Sig Dispense Refill   • sertraline (ZOLOFT) 50 MG Tab Take 1 Tab by mouth every day. 30 Tab 11   • sertraline (ZOLOFT) 25 MG tablet Take 1 Tab by mouth every day. 90 Tab 3   • Diclofenac Sodium 1 % Gel Apply  to skin as directed.     • Multiple Vitamin (MULTI-VITAMIN DAILY PO) Take  by mouth.     • levonorgestrel (MIRENA) 20 MCG/24HR IUD 1 Each by Intrauterine route Once.       No current facility-administered medications for this visit.      No Known Allergies    Review of Systems   Constitutional: Negative for fever, chills and malaise/fatigue.   HENT: Negative for congestion. She mentions that she does feel like she has noticed more of a hearing issue with people wearing masks. Although it has not changed.   Eyes: Negative for pain.   Respiratory: Negative for cough and shortness of breath.    Cardiovascular: Negative for chest pain and leg swelling.   Gastrointestinal: Negative for nausea, vomiting, abdominal pain and diarrhea.   Genitourinary: Negative for dysuria and hematuria.   Skin: Negative for rash.   Neurological: Negative for dizziness, focal weakness and headaches.   Endo/Heme/Allergies: Does not bruise/bleed easily.   Psychiatric/Behavioral: Negative for depression.  The patient is not nervous/anxious.      Objective:   /78   Pulse 72   Temp 36.7 °C (98 °F)   Resp 16   Ht 1.676 m (5' 6\")   Wt 67.1 kg (148 lb)   SpO2 98%   BMI 23.89 kg/m²     Wt Readings from Last 4 Encounters:   07/08/20 67.1 kg (148 lb)   03/02/20 68 kg (150 lb)   11/18/19 69.1 kg (152 lb 6.4 oz)   08/16/19 68.5 kg (151 lb)     A chaperone was offered to the patient during today's exam. Patient declined " chaperone.    Physical Exam:  Constitutional: Well-developed and well-nourished. Not diaphoretic. No distress.   Skin: Skin is warm and dry. No rash noted.  Head: Atraumatic without lesions.  Eyes: Conjunctivae and extraocular motions are normal. Pupils are equal, round, and reactive to light. No scleral icterus.   Ears:  External ears unremarkable. Tympanic membranes clear and intact.  Nose: Nares patent. Septum midline. Turbinates without erythema nor edema. No discharge.   Mouth/Throat: Tongue normal. Oropharynx is clear and moist. Posterior pharynx without erythema or exudates.  Neck: Supple, trachea midline. Normal range of motion. No thyromegaly present. No lymphadenopathy--cervical or supraclavicular.  Cardiovascular: Regular rate and rhythm, S1 and S2 without murmur, rubs, or gallops.    Breast: Breasts examined seated and supine. No skin changes, peau d'orange or nipple retraction. No discharge. No axillary or supraclavicular adenopathy. No masses or nodularity palpable.  Abdomen: Soft, non tender, and without distention. Active bowel sounds in all four quadrants. No rebound, guarding, masses or HSM.  : Perineum and external genitalia normal without rash. Vagina with normal and physiologic discharge. Cervix without visible lesions or discharge. +IUD strings visible.  Extremities: No cyanosis, clubbing, erythema, nor edema. Distal pulses intact and symmetric.   Musculoskeletal: All major joints AROM full in all directions without pain.  Neurological: Alert and oriented x 3. Grossly non-focal. Strength and sensation grossly intact.   Psychiatric:  Behavior, mood, and affect are appropriate.    Assessment and Plan:     1. Well woman exam  2. Cervical cancer screening  3. Screening for HPV (human papillomavirus)  This is a healthy 30-year-old female here today for a preventative exam.  Previous medical history, healthcare maintenance and immunization status reviewed.  Patient is up to date.  See discussion of  anticipatory guidance below.  Patient will return annually for preventative exams.  - THINPREP PAP WITH HPV; Future    4. Major depressive disorder with single episode, in full remission (HCC)  Mood stable.  Denies crisis.  Will continue with current dose of 75mg, sent in 90-day supplies.  - sertraline (ZOLOFT) 50 MG Tab; Take 1 Tab by mouth every day.  Dispense: 30 Tab; Refill: 11  - sertraline (ZOLOFT) 25 MG tablet; Take 1 Tab by mouth every day.  Dispense: 90 Tab; Refill: 3    Health maintenance:  UTD   Labs per orders  Immunizations per orders  Patient counseled about skin care, diet, supplements, and exercise.  Discussed  family planning choices, diet and exercise, healthy alcohol consumption     Follow-up: Return in about 1 year (around 7/8/2021) for Annual.

## 2020-07-09 LAB
CYTOLOGY REG CYTOL: NORMAL
HPV HR 12 DNA CVX QL NAA+PROBE: NEGATIVE
HPV16 DNA SPEC QL NAA+PROBE: NEGATIVE
HPV18 DNA SPEC QL NAA+PROBE: NEGATIVE
SPECIMEN SOURCE: NORMAL

## 2020-10-27 ENCOUNTER — IMMUNIZATION (OUTPATIENT)
Dept: SOCIAL WORK | Facility: CLINIC | Age: 30
End: 2020-10-27
Payer: COMMERCIAL

## 2020-10-27 DIAGNOSIS — Z23 NEED FOR VACCINATION: Primary | ICD-10-CM

## 2021-03-18 PROCEDURE — 90686 IIV4 VACC NO PRSV 0.5 ML IM: CPT | Performed by: REGISTERED NURSE

## 2021-03-18 PROCEDURE — 90471 IMMUNIZATION ADMIN: CPT | Performed by: REGISTERED NURSE

## 2021-07-15 ENCOUNTER — APPOINTMENT (OUTPATIENT)
Dept: MEDICAL GROUP | Facility: PHYSICIAN GROUP | Age: 31
End: 2021-07-15
Payer: COMMERCIAL

## 2021-07-28 ENCOUNTER — OFFICE VISIT (OUTPATIENT)
Dept: MEDICAL GROUP | Facility: PHYSICIAN GROUP | Age: 31
End: 2021-07-28
Payer: COMMERCIAL

## 2021-07-28 ENCOUNTER — TELEPHONE (OUTPATIENT)
Dept: MEDICAL GROUP | Facility: PHYSICIAN GROUP | Age: 31
End: 2021-07-28

## 2021-07-28 VITALS
SYSTOLIC BLOOD PRESSURE: 116 MMHG | RESPIRATION RATE: 20 BRPM | TEMPERATURE: 98.1 F | OXYGEN SATURATION: 96 % | BODY MASS INDEX: 24.68 KG/M2 | HEIGHT: 66 IN | HEART RATE: 76 BPM | DIASTOLIC BLOOD PRESSURE: 82 MMHG | WEIGHT: 153.6 LBS

## 2021-07-28 DIAGNOSIS — F33.42 RECURRENT MAJOR DEPRESSIVE DISORDER, IN FULL REMISSION (HCC): ICD-10-CM

## 2021-07-28 DIAGNOSIS — R87.619 ATYPICAL ENDOCERVICAL CELLS ON CERVICAL PAPANICOLAOU SMEAR: ICD-10-CM

## 2021-07-28 DIAGNOSIS — M25.561 CHRONIC PAIN OF RIGHT KNEE: ICD-10-CM

## 2021-07-28 DIAGNOSIS — G89.29 CHRONIC PAIN OF RIGHT KNEE: ICD-10-CM

## 2021-07-28 PROCEDURE — 99214 OFFICE O/P EST MOD 30 MIN: CPT | Performed by: PHYSICIAN ASSISTANT

## 2021-07-28 ASSESSMENT — PATIENT HEALTH QUESTIONNAIRE - PHQ9
4. FEELING TIRED OR HAVING LITTLE ENERGY: SEVERAL DAYS
8. MOVING OR SPEAKING SO SLOWLY THAT OTHER PEOPLE COULD HAVE NOTICED. OR THE OPPOSITE, BEING SO FIGETY OR RESTLESS THAT YOU HAVE BEEN MOVING AROUND A LOT MORE THAN USUAL: NOT AT ALL
9. THOUGHTS THAT YOU WOULD BE BETTER OFF DEAD, OR OF HURTING YOURSELF: NOT AT ALL
CLINICAL INTERPRETATION OF PHQ2 SCORE: 0
1. LITTLE INTEREST OR PLEASURE IN DOING THINGS: NOT AT ALL
3. TROUBLE FALLING OR STAYING ASLEEP OR SLEEPING TOO MUCH: SEVERAL DAYS
2. FEELING DOWN, DEPRESSED, IRRITABLE, OR HOPELESS: NOT AT ALL
5. POOR APPETITE OR OVEREATING: NOT AT ALL
5. POOR APPETITE OR OVEREATING: 0 - NOT AT ALL
SUM OF ALL RESPONSES TO PHQ9 QUESTIONS 1 AND 2: 0
6. FEELING BAD ABOUT YOURSELF - OR THAT YOU ARE A FAILURE OR HAVE LET YOURSELF OR YOUR FAMILY DOWN: NOT AL ALL
SUM OF ALL RESPONSES TO PHQ QUESTIONS 1-9: 2
7. TROUBLE CONCENTRATING ON THINGS, SUCH AS READING THE NEWSPAPER OR WATCHING TELEVISION: NOT AT ALL

## 2021-07-28 NOTE — PROGRESS NOTES
Chief Complaint   Patient presents with   • Establish Care   • Abnormal Pap Smear     about 1 yr ago   • IUD Removal       HISTORY OF PRESENT ILLNESS: Fouzia Saldana is an established 31 y.o. female here to discuss the evaluation and management of:    Recurrent major depressive disorder, in full remission (HCC)  Chronic but stable problem.  Patient takes Zoloft 75 mg once daily.  Denies side effects or complications of medication.  States depression is managed on this regimen.  Patient exercises regularly and exercise routine consist of cardiovascular.  She tells me that she walks 1 mile per day and rides her bike 30 minutes 5 days a week.  Patient is following up with therapist once a month.  States her diet is healthy.  Denies homicidal or suicidal ideation.  She would like to continue Zoloft as a treatment option.    Depression Screening    Little interest or pleasure in doing things?  0 - not at all  Feeling down, depressed , or hopeless? 0 - not at all  Patient Health Questionnaire Score: 0    If depressive symptoms identified deferred to follow up visit unless specifically addressed in assesment and plan.      Interpretation of PHQ-9 Total Score   Score Severity   1-4 Minimal Depression   5-9 Mild Depression   10-14 Moderate Depression   15-19 Moderately Severe Depression   20-27 Severe Depression        Chronic pain of right knee  Chronic problem.  Stable.  Patient states she has an old right knee injury and occasionally experiences an intermittent aching pain and uses topical Voltaren gel with resolution of symptoms.  This works well for her.   Does not need refills at this time.  Denies muscle weakness, muscle atrophy, neuropathy, swelling, erythema, warmth.  Denies gait abnormalities.      Atypical endocervical cells on cervical Papanicolaou smear  Pap smear from 7/8/2020 indicated atypical squamous cell.  Patient was advised to repeat Pap in 6 months.  She tells me that she was unable to get her Pap  done and would like to get repeat Pap as well as IUD removed.  Patient states she is had her IUD for 5 years.  Patient is asymptomatic.        Patient Active Problem List    Diagnosis Date Noted   • Atypical endocervical cells on cervical Papanicolaou smear 2021   • Chronic pain of right knee 2021   • Current moderate episode of major depressive disorder without prior episode (HCC) 2019   • Acne 2013       Allergies:Patient has no known allergies.    Current Outpatient Medications   Medication Sig Dispense Refill   • sertraline (ZOLOFT) 50 MG Tab Take 1 Tab by mouth every day. 30 Tab 11   • sertraline (ZOLOFT) 25 MG tablet Take 1 Tab by mouth every day. 90 Tab 3   • Diclofenac Sodium 1 % Gel Apply  to skin as directed.     • Multiple Vitamin (MULTI-VITAMIN DAILY PO) Take  by mouth.     • levonorgestrel (MIRENA) 20 MCG/24HR IUD 1 Each by Intrauterine route Once.       No current facility-administered medications for this visit.       Social History     Tobacco Use   • Smoking status: Never Smoker   • Smokeless tobacco: Never Used   Vaping Use   • Vaping Use: Never used   Substance Use Topics   • Alcohol use: Yes     Comment: 5 days a week 2-3 beer per time.    • Drug use: No       Family Status   Relation Name Status   • MGMo     • PGFa     • Mo  Alive   • MGFa     • PGMo  Alive   • Fa  Alive   • Bro older Alive     Family History   Problem Relation Age of Onset   • Cancer Maternal Grandmother 75        bone ca   • Lung Disease Maternal Grandmother         copd/smoker   • Cancer Paternal Grandfather         thinks colon ca   • Thyroid Mother         hypo   • Hyperlipidemia Mother    • Hypertension Mother    • Lung Disease Maternal Grandfather         emyphsema/copd.smoker   • Lung Disease Paternal Grandmother         asthma   • Heart Attack Paternal Grandmother         minor silent MI       ROS:  Review of Systems   Constitutional: Negative for fever, chills, weight  "loss and malaise/fatigue.   HENT: Negative for ear pain, nosebleeds, congestion, sore throat and neck pain.    Eyes: Negative for blurred vision.   Respiratory: Negative for cough, sputum production, shortness of breath and wheezing.    Cardiovascular: Negative for chest pain, palpitations, orthopnea and leg swelling.   Gastrointestinal: Negative for heartburn, nausea, vomiting and abdominal pain.   Genitourinary: Negative for dysuria, urgency and frequency.   Musculoskeletal: Negative for myalgias, back pain and joint pain.   Skin: Negative for rash and itching.   Neurological: Negative for dizziness, tingling, tremors, sensory change, focal weakness and headaches.   Endo/Heme/Allergies: Does not bruise/bleed easily.   Psychiatric/Behavioral: Negative for depression, suicidal ideas and memory loss.  The patient is not nervous/anxious and does not have insomnia.    All other systems reviewed and are negative except as in HPI.    Exam: /82 (BP Location: Left arm, Patient Position: Sitting, BP Cuff Size: Adult)   Pulse 76   Temp 36.7 °C (98.1 °F) (Temporal)   Resp 20   Ht 1.676 m (5' 6\")   Wt 69.7 kg (153 lb 9.6 oz)   SpO2 96%  Body mass index is 24.79 kg/m².  General: Normal appearing. No distress.  HEENT: Normocephalic. Eyes conjunctiva clear lids without ptosis, ears normal shape and contour.  Neck: Supple without JVD. Thyroid is not enlarged.  Pulmonary: Clear to ausculation.  Normal effort. No rales, ronchi, or wheezing.  Cardiovascular: Regular rate and rhythm without murmur.   Abdomen: Nondistended.   Neurologic: Grossly nonfocal.  Cranial nerves are normal.  Skin: Warm and dry.  No rashes or suspicious skin lesions.  Musculoskeletal: Normal gait. No extremity cyanosis, clubbing, or edema.  Psych: Normal mood and affect. Alert and oriented x3. Judgment and insight is normal.    Medical decision-making and discussion:  1. Recurrent major depressive disorder, in full remission (HCC)  Chronic but " stable problem.  I will manage Zoloft medication for patient.  Patient does not need a Zoloft refill at this time but when patient needs a refill I will refill medication for pt.   Patient is feeling well on current medications. Will continue. Denies any suicidal or homicidal ideation. Emphasized importance of healthy diet and exercise. Discussed that should the patient have any symptoms they should call suicide prevention hotline or report to the emergency room immediately.      2. Chronic pain of right knee  Chronic but stable problem.  Advised patient to continue Voltaren gel as needed.  Continue living an active lifestyle.  Continue to monitor.    3. Atypical endocervical cells on cervical Papanicolaou smear  Reviewed patient's Pap smear results from 7/20/2020.  Positive for atypical squamous cells.  Patient is asymptomatic.  Discussed with patient I will speak to Dr. Hernández to see if she can be placed on her schedule in the near future for Pap smear and IUD removal and replacement.  Patient will be contacted with this information once I obtain it.  If Dr. Hernández or another provider in the office is able to see patient in the near future discussed with patient I will refer her to gynecology.  Patient agreed to plan.        Please note that this dictation was created using voice recognition software. I have made every reasonable attempt to correct obvious errors, but I expect that there are errors of grammar and possibly content that I did not discover before finalizing the note.    Assessment/Plan:  1. Recurrent major depressive disorder, in full remission (HCC)     2. Chronic pain of right knee     3. Atypical endocervical cells on cervical Papanicolaou smear         Return if symptoms worsen or fail to improve.

## 2021-07-28 NOTE — TELEPHONE ENCOUNTER
Phone Number Called: [phone] (home)     Call outcome: Did not leave a detailed message. Requested patient to call back.    Message: LVM for the Pt to cb and schedule appt with PCP for Pap and paperwork to order Mirena IUD.

## 2021-07-29 ENCOUNTER — TELEPHONE (OUTPATIENT)
Dept: MEDICAL GROUP | Facility: PHYSICIAN GROUP | Age: 31
End: 2021-07-29

## 2021-07-29 NOTE — TELEPHONE ENCOUNTER
Phone Number Called: [phone] (home)     Call outcome: Did not leave a detailed message. Requested patient to call back.    Message: LVM for Pt to cb and schedule an appt for Pap and IUD Paperwork.  2nd call.

## 2021-07-30 NOTE — TELEPHONE ENCOUNTER
VOICEMAIL  1. Caller Name: Fouzia Saldana         Call Back Number: [phone]      2. Message: Patient LVM wanting to set up annual appt with follow up and Pap    3. Patient approves office to leave a detailed voicemail/MyChart message: N\A

## 2021-07-30 NOTE — TELEPHONE ENCOUNTER
Phone Number Called: [phone]      Call outcome: Spoke to patient regarding message below.    Message: Patient has schheduled appt for 08/17/2021 for Anca Santos and Paperwork for IUD.

## 2021-09-01 ENCOUNTER — OFFICE VISIT (OUTPATIENT)
Dept: MEDICAL GROUP | Facility: PHYSICIAN GROUP | Age: 31
End: 2021-09-01
Payer: COMMERCIAL

## 2021-09-01 ENCOUNTER — HOSPITAL ENCOUNTER (OUTPATIENT)
Facility: MEDICAL CENTER | Age: 31
End: 2021-09-01
Attending: PHYSICIAN ASSISTANT
Payer: COMMERCIAL

## 2021-09-01 VITALS
WEIGHT: 154 LBS | BODY MASS INDEX: 24.75 KG/M2 | DIASTOLIC BLOOD PRESSURE: 86 MMHG | HEART RATE: 76 BPM | HEIGHT: 66 IN | OXYGEN SATURATION: 93 % | RESPIRATION RATE: 12 BRPM | SYSTOLIC BLOOD PRESSURE: 110 MMHG | TEMPERATURE: 98.2 F

## 2021-09-01 DIAGNOSIS — Z11.51 SCREENING FOR HPV (HUMAN PAPILLOMAVIRUS): ICD-10-CM

## 2021-09-01 DIAGNOSIS — R87.610 ATYPICAL SQUAMOUS CELLS OF UNDETERMINED SIGNIFICANCE (ASC-US) ON CERVICAL PAP SMEAR: ICD-10-CM

## 2021-09-01 DIAGNOSIS — Z01.419 WOMEN'S ANNUAL ROUTINE GYNECOLOGICAL EXAMINATION: ICD-10-CM

## 2021-09-01 DIAGNOSIS — F32.5 MAJOR DEPRESSIVE DISORDER WITH SINGLE EPISODE, IN FULL REMISSION (HCC): ICD-10-CM

## 2021-09-01 PROCEDURE — 88175 CYTOPATH C/V AUTO FLUID REDO: CPT

## 2021-09-01 PROCEDURE — 99395 PREV VISIT EST AGE 18-39: CPT | Performed by: PHYSICIAN ASSISTANT

## 2021-09-01 PROCEDURE — 99000 SPECIMEN HANDLING OFFICE-LAB: CPT | Performed by: PHYSICIAN ASSISTANT

## 2021-09-01 PROCEDURE — 87624 HPV HI-RISK TYP POOLED RSLT: CPT

## 2021-09-01 RX ORDER — SERTRALINE HYDROCHLORIDE 25 MG/1
25 TABLET, FILM COATED ORAL DAILY
Qty: 90 TABLET | Refills: 3 | Status: SHIPPED | OUTPATIENT
Start: 2021-09-01 | End: 2021-12-02 | Stop reason: SDUPTHER

## 2021-09-01 NOTE — PROGRESS NOTES
SUBJECTIVE: 31 y.o. female for annual routine gynecologic exam  Chief Complaint   Patient presents with   • Gynecologic Exam   • Paperwork     IUD order   • Medication Refill     Sertraline     Patient has a positive medical history for depression.  Depression is managed with 75 mg Zoloft once daily.  She denies side effects or complications from medication.  This regimen works well for her.  Patient is requesting refill.  She lives an active lifestyle and her diet is healthy.  Denies homicidal or suicidal ideation.      OB History    Para Term  AB Living   0 0 0 0 0 0   SAB TAB Ectopic Molar Multiple Live Births   0 0 0 0 0 0      Last Pap: 7/10/2020  Social History     Substance and Sexual Activity   Sexual Activity Yes   • Partners: Male   • Birth control/protection: I.U.D.    Comment: . weamadeo: assistant       Sexual history: currently sexually active, single partner, heterosexual, contraceptive--IUD   H/O Abnormal Pap yes on 7/10/2020. Pap was + for ATYPICAL SQUAMOUS CELLS OF UNDETERMINED SIGNIFICANCE and advised to f/u in 6 months for repeat pap. Was unable to f/u due to Covid and busy schedule.   She  reports that she has never smoked. She has never used smokeless tobacco.        Allergies: Patient has no known allergies.     ROS:    Reports no menopause symptoms of hot flashes, night sweats, sleep disruption, mood changes.Denies vaginal dryness.   Patient has a Mirena IUD and intermittently will experience spotting but does not always have a menses.  Cramping is no.   She does not take OTC analgesics for cramping  No significant bloating/fluid retention, pelvic pain, or dyspareunia. No vaginal discharge   No breast tenderness, mass, nipple discharge, changes in size or contour, or abnormal cyclic discomfort.  No urinary tract symptoms, no incontinence, no polydipsia, polyuria,  No abdominal pain, change in bowel habits, black or bloody stools.    No unusual headaches, no visual  "changes, menstrual migraines   No prolonged cough. No dyspnea or chest pain on exertion.  No depression, labile mood, anxiety, libido changes, insomnia.  No temperature intolerance.  No new/concerning skin lesions, concerns.     Exercise: moderate regular exercise program. Exercise routine consist of running 1 mile 7 days a week and biking 5 days a week 30  Minutes per time.   Preventive Care: Pt. Does not do self breast exams. Pt. Take an OTC multi-vitamin once daily.     Current medicines (including changes today)  Current Outpatient Medications   Medication Sig Dispense Refill   • sertraline (ZOLOFT) 25 MG tablet Take 1 Tablet by mouth every day. 90 Tablet 3   • sertraline (ZOLOFT) 50 MG Tab Take 1 Tablet by mouth every day. 30 Tablet 11   • Diclofenac Sodium 1 % Gel Apply  to skin as directed.     • Multiple Vitamin (MULTI-VITAMIN DAILY PO) Take  by mouth.     • levonorgestrel (MIRENA) 20 MCG/24HR IUD 1 Each by Intrauterine route Once.       No current facility-administered medications for this visit.     She  has a past medical history of Depression, Hypertension, and Migraine.  She  has a past surgical history that includes dental extraction(s).     Family History:   Family History   Problem Relation Age of Onset   • Cancer Maternal Grandmother 75        bone ca   • Lung Disease Maternal Grandmother         copd/smoker   • Cancer Paternal Grandfather         thinks colon ca   • Thyroid Mother         hypo   • Hyperlipidemia Mother    • Hypertension Mother    • Lung Disease Maternal Grandfather         emyphsema/copd.smoker   • Lung Disease Paternal Grandmother         asthma   • Heart Attack Paternal Grandmother         minor silent MI       OBJECTIVE:   /86 (BP Location: Right arm, Patient Position: Sitting, BP Cuff Size: Adult)   Pulse 76   Temp 36.8 °C (98.2 °F) (Temporal)   Resp 12   Ht 1.676 m (5' 6\")   Wt 69.9 kg (154 lb)   SpO2 93%   BMI 24.86 kg/m²   Body mass index is 24.86 " "kg/m².    Exam: /86 (BP Location: Right arm, Patient Position: Sitting, BP Cuff Size: Adult)   Pulse 76   Temp 36.8 °C (98.2 °F) (Temporal)   Resp 12   Ht 1.676 m (5' 6\")   Wt 69.9 kg (154 lb)   SpO2 93%   General: Normal appearing. No distress.  HEENT: Normocephalic. Eyes conjunctiva clear lids without ptosis, pupils equal and reactive to light accommodation, ears normal shape and contour, canals are clear bilaterally, tympanic membranes are benign, nasal mucosa benign, oropharynx is without erythema, edema or exudates.   Neck: Supple without JVD or bruit. Thyroid is not enlarged.  Pulmonary: Clear to ausculation.  Normal effort. No rales, ronchi, or wheezing.  Cardiovascular: Regular rate and rhythm without murmur. Carotid and radial pulses are intact and equal bilaterally.  Abdomen: Soft, nontender, nondistended. Normal bowel sounds. Liver and spleen are not palpable  Neurologic: Grossly nonfocal.  Cranial nerves are normal.  Lymph: No cervical, supraclavicular or axillary lymph nodes are palpable  Skin: Warm and dry.  No rashes or suspicious skin lesions.  Musculoskeletal: Normal gait. No extremity cyanosis, clubbing, or edema.  Psych: Normal mood and affect. Alert and oriented x3. Judgment and insight is normal.     Breast Exam: Performed with instruction during examination. No axillary lymphadenopathy, no skin changes, no dominant masses. No nipple retraction  Pelvic Exam -  Normal external genitalia with no lesions. Vaginal Mucosa:  normal vaginal mucosa . Cervix with no visible lesions.  Positive for IUD strings.  No cervical motion tenderness. Uterus is normal sized with no masses. No adnexal tenderness or enlargement appreciated. Thin Prep Pap is obtained, vaginal swab is obtained and specimen(s) sent to lab  Rectal: deferred    <ASSESSMENT and PLAN>  1. Women's annual routine gynecological examination   Thin Prep Pap is obtained, vaginal swab is obtained and specimen(s) sent to lab     During " today's appointment paperwork has been completed to order a replacement Mirena IUD.  Patient is past due for her IUD.  She tells me Julia IUD was placed in July 2016.  Dr. Hernández has agreed to remove current IUD and replace IUD.  Once we receive the IUD, patient will be contacted to be scheduled for removal and replacement.  Patient agreed to plan.    - THINPREP PAP WITH HPV; Future    2. Atypical squamous cells of undetermined significance (ASC-US) on cervical Pap smear   Thin Prep Pap is obtained, vaginal swab is obtained and specimen(s) sent to lab      - THINPREP PAP WITH HPV; Future    3. Screening for HPV (human papillomavirus)   Thin Prep Pap is obtained, vaginal swab is obtained and specimen(s) sent to lab    - THINPREP PAP WITH HPV; Future    4. Major depressive disorder with single episode, in full remission (HCC)  Patient is feeling well on current medications. Will continue. Denies any suicidal or homicidal ideation. Emphasized importance of healthy diet and exercise. Discussed that should the patient have any symptoms they should call suicide prevention hotline or report to the emergency room immediately.    - sertraline (ZOLOFT) 25 MG tablet; Take 1 Tablet by mouth every day.  Dispense: 90 Tablet; Refill: 3  - sertraline (ZOLOFT) 50 MG Tab; Take 1 Tablet by mouth every day.  Dispense: 30 Tablet; Refill: 11    1. Women's annual routine gynecological examination  THINPREP PAP WITH HPV   2. Atypical squamous cells of undetermined significance (ASC-US) on cervical Pap smear  THINPREP PAP WITH HPV   3. Screening for HPV (human papillomavirus)  THINPREP PAP WITH HPV   4. Major depressive disorder with single episode, in full remission (HCC)  sertraline (ZOLOFT) 25 MG tablet    sertraline (ZOLOFT) 50 MG Tab       Discussed  breast self exam, mammography screening, feminine hygiene, use and side effects of IUD, adequate intake of calcium and vitamin D, diet and exercise   Follow-up in 3 years for next Gyn  exam and 3 years for next Pap.   Next office visit for recheck of chronic medical conditions is due in 1 year for annual.

## 2021-09-02 DIAGNOSIS — Z01.419 WOMEN'S ANNUAL ROUTINE GYNECOLOGICAL EXAMINATION: ICD-10-CM

## 2021-09-02 DIAGNOSIS — Z11.51 SCREENING FOR HPV (HUMAN PAPILLOMAVIRUS): ICD-10-CM

## 2021-09-02 DIAGNOSIS — R87.610 ATYPICAL SQUAMOUS CELLS OF UNDETERMINED SIGNIFICANCE (ASC-US) ON CERVICAL PAP SMEAR: ICD-10-CM

## 2021-12-02 ENCOUNTER — OFFICE VISIT (OUTPATIENT)
Dept: MEDICAL GROUP | Facility: MEDICAL CENTER | Age: 31
End: 2021-12-02
Payer: COMMERCIAL

## 2021-12-02 VITALS
DIASTOLIC BLOOD PRESSURE: 88 MMHG | HEIGHT: 66 IN | WEIGHT: 158.6 LBS | BODY MASS INDEX: 25.49 KG/M2 | SYSTOLIC BLOOD PRESSURE: 128 MMHG | TEMPERATURE: 97.5 F | OXYGEN SATURATION: 96 % | RESPIRATION RATE: 16 BRPM | HEART RATE: 60 BPM

## 2021-12-02 DIAGNOSIS — F32.5 MAJOR DEPRESSIVE DISORDER WITH SINGLE EPISODE, IN FULL REMISSION (HCC): ICD-10-CM

## 2021-12-02 DIAGNOSIS — Z97.5 IUD (INTRAUTERINE DEVICE) IN PLACE: ICD-10-CM

## 2021-12-02 DIAGNOSIS — F32.1 CURRENT MODERATE EPISODE OF MAJOR DEPRESSIVE DISORDER WITHOUT PRIOR EPISODE (HCC): ICD-10-CM

## 2021-12-02 DIAGNOSIS — Z00.00 PREVENTATIVE HEALTH CARE: ICD-10-CM

## 2021-12-02 PROCEDURE — 99213 OFFICE O/P EST LOW 20 MIN: CPT | Performed by: PHYSICIAN ASSISTANT

## 2021-12-02 RX ORDER — SERTRALINE HYDROCHLORIDE 25 MG/1
25 TABLET, FILM COATED ORAL DAILY
Qty: 90 TABLET | Refills: 1 | Status: SHIPPED | OUTPATIENT
Start: 2021-12-02 | End: 2022-07-28

## 2021-12-02 NOTE — PROGRESS NOTES
"Chief Complaint   Patient presents with   • Establish Care       HPI  Fouzia Saldana is a 31 y.o. female here today for establishing care.    Patient has history of depression, has been on Zoloft 75 mg daily for about 2 years and has been doing great on it.  States before medicine she would get depressive symptoms including depressed mood suicidal ideations low interest anhedonia low energy.  Since starting medicine patient states all her symptoms has been improved and patient feels well, no SI HI, no SE such as decreased libido.        Patient currently has a Mirena IUD in place which needs to be replaced and she would like to replace it with another Mirena IUD.        Exam:  /88 (BP Location: Left arm, Patient Position: Sitting)   Pulse 60   Temp 36.4 °C (97.5 °F) (Temporal)   Resp 16   Ht 1.676 m (5' 6\")   Wt 71.9 kg (158 lb 9.6 oz)   SpO2 96%       Constitutional: Alert, oriented in no acute distress.  Psych: Eye contact is good, speech goal directed, affect calm  Eyes: Conjunctiva non-injected, sclera non-icteric.  Lungs: Unlabored respiratory effort, clear to auscultation bilaterally with good excursion, no wheez or rhonci  CV: regular rate and rhythm. No lower extremity edema  Ears:  External ears unremarkable. TMs pearly gray, clear and intact, w/o any perforation or effusion.        A/P:  1. Preventative health care    - CBC WITH DIFFERENTIAL; Future  - Comp Metabolic Panel; Future  - Lipid Profile; Future  - TSH WITH REFLEX TO FT4; Future  - VITAMIN D,25 HYDROXY; Future    2. Current moderate episode of major depressive disorder without prior episode (HCC)      3. IUD (intrauterine device) in place  Loves her IUD without any spotting.  Would like to switch with another Mirena IUD        F/U:  "

## 2021-12-08 ENCOUNTER — OFFICE VISIT (OUTPATIENT)
Dept: MEDICAL GROUP | Facility: MEDICAL CENTER | Age: 31
End: 2021-12-08
Payer: COMMERCIAL

## 2021-12-08 VITALS
WEIGHT: 159 LBS | SYSTOLIC BLOOD PRESSURE: 120 MMHG | BODY MASS INDEX: 25.55 KG/M2 | TEMPERATURE: 97.7 F | HEART RATE: 61 BPM | RESPIRATION RATE: 12 BRPM | HEIGHT: 66 IN | DIASTOLIC BLOOD PRESSURE: 74 MMHG | OXYGEN SATURATION: 94 %

## 2021-12-08 DIAGNOSIS — Z30.433 ENCOUNTER FOR REMOVAL AND REINSERTION OF INTRAUTERINE CONTRACEPTIVE DEVICE (IUD): ICD-10-CM

## 2021-12-08 PROCEDURE — 99213 OFFICE O/P EST LOW 20 MIN: CPT | Performed by: FAMILY MEDICINE

## 2021-12-08 NOTE — PROGRESS NOTES
Subjective:     CC: Discuss IUD removal/reinsertion    HPI:   Fouzia is a pleasant patient of Zoila Allred PA-C who presents to discuss:    Encounter for removal and reinsertion of intrauterine contraceptive device (IUD)  Patient presents to discuss removal and reinsertion of Mirena IUD.  She reports her current Mirena was placed about 5 and half years ago.  She reports this form of contraception has worked well for her and would like a new Mirena placed.  She is sexually monogamous with her , Pap smear completed 9/2021 reviewed cotesting negative/normal.  She denies vaginal discharge, pain, foul odor, pelvic cramping.  She does not have menstrual cycles, occasional spotting.      Past Medical History:   Diagnosis Date   • Depression     had some depression situationally unemployed, soped, self-resolved   • Migraine     hx migraines (on imitrex) in her teens, resolved in her 20s       Social History     Tobacco Use   • Smoking status: Never Smoker   • Smokeless tobacco: Never Used   Vaping Use   • Vaping Use: Never used   Substance Use Topics   • Alcohol use: Yes     Comment: 5 days a week 2-3 beer per time.    • Drug use: No       Current Outpatient Medications Ordered in Epic   Medication Sig Dispense Refill   • levonorgestrel (MIRENA, 52 MG,) 52 mg (20 mcg/24 hr) IUD 1 Intra Uterine Device by Intrauterine route one time for 1 dose. 1 Each 0   • sertraline (ZOLOFT) 25 MG tablet Take 1 Tablet by mouth every day. 90 Tablet 1   • sertraline (ZOLOFT) 50 MG Tab Take 1 Tablet by mouth every day. 30 Tablet 1   • Diclofenac Sodium 1 % Gel Apply  to skin as directed.     • Multiple Vitamin (MULTI-VITAMIN DAILY PO) Take  by mouth.     • levonorgestrel (MIRENA) 20 MCG/24HR IUD 1 Each by Intrauterine route Once.       No current Epic-ordered facility-administered medications on file.       Allergies:  Patient has no known allergies.    ROS:  Gen: no fevers/chills, no changes in weight  Eyes: no changes in  "vision  ENT: no sore throat, no hearing loss, no bloody nose  Pulm: no SOB  CV: no chest pain        Objective:       Exam:  /74 (BP Location: Left arm, Patient Position: Sitting, BP Cuff Size: Adult)   Pulse 61   Temp 36.5 °C (97.7 °F) (Temporal)   Resp 12   Ht 1.676 m (5' 6\")   Wt 72.1 kg (159 lb)   SpO2 94%   BMI 25.66 kg/m²  Body mass index is 25.66 kg/m².    Constitutional: Alert, no distress, well-groomed  Skin: Warm, dry, good turgor, no rashes in visible areas  Eyes: Equal, round and reactive, conjunctiva clear, no ptosis  Respiratory: Unlabored respiratory effort  Psych: Alert and oriented, normal affect and mood    Assessment & Plan:     31 y.o. female with the following -     1. Encounter for removal and reinsertion of intrauterine contraceptive device (IUD)  Discussed risks, benefits, and alternative to Mirena IUD removal and reinsertion; patient verbalized understanding and would like to proceed with Mirena removal and reinsertion. Patient will be scheduled once device received.  - levonorgestrel (MIRENA, 52 MG,) 52 mg (20 mcg/24 hr) IUD; 1 Intra Uterine Device by Intrauterine route one time for 1 dose.  Dispense: 1 Each; Refill: 0    Please note this dictation was created using voice recognition software. I have made every reasonable attempt to correct obvious errors, but I expect there may be errors of grammar, and possibly content, that I did not discover before finalizing the note.       "

## 2021-12-09 ENCOUNTER — PATIENT MESSAGE (OUTPATIENT)
Dept: MEDICAL GROUP | Facility: MEDICAL CENTER | Age: 31
End: 2021-12-09

## 2022-01-15 NOTE — PATIENT COMMUNICATION
Scheduled pt for placement as follow:  Future Appointments       Provider Department Center    2/1/2022 2:30 PM Karrie Arshad M.D. [facility]

## 2022-02-28 ENCOUNTER — OFFICE VISIT (OUTPATIENT)
Dept: MEDICAL GROUP | Facility: MEDICAL CENTER | Age: 32
End: 2022-02-28
Payer: COMMERCIAL

## 2022-02-28 VITALS
TEMPERATURE: 97 F | BODY MASS INDEX: 25.88 KG/M2 | OXYGEN SATURATION: 96 % | HEIGHT: 66 IN | HEART RATE: 81 BPM | SYSTOLIC BLOOD PRESSURE: 122 MMHG | DIASTOLIC BLOOD PRESSURE: 80 MMHG | WEIGHT: 161 LBS

## 2022-02-28 DIAGNOSIS — Z53.8 UNSUCCESSFUL IUD INSERTION: ICD-10-CM

## 2022-02-28 DIAGNOSIS — Z97.5 IUD (INTRAUTERINE DEVICE) IN PLACE: ICD-10-CM

## 2022-02-28 PROCEDURE — 58301 REMOVE INTRAUTERINE DEVICE: CPT | Performed by: FAMILY MEDICINE

## 2022-02-28 PROCEDURE — 99999 PR NO CHARGE: CPT | Performed by: FAMILY MEDICINE

## 2022-02-28 RX ORDER — MISOPROSTOL 200 UG/1
400 TABLET ORAL ONCE
Qty: 2 TABLET | Refills: 0 | Status: SHIPPED | OUTPATIENT
Start: 2022-02-28 | End: 2022-02-28

## 2022-02-28 ASSESSMENT — PATIENT HEALTH QUESTIONNAIRE - PHQ9: CLINICAL INTERPRETATION OF PHQ2 SCORE: 0

## 2022-02-28 NOTE — PROCEDURES
IUD REMOVAL PROCEDURE NOTE:    Indication:  IUD    Speculum placed in the vagina and cervix visualized. IUD strings seen. IUD strings grasped with ring forceps and removed intact. Hemostasis noted. Patient tolerated procedure well without complication.     IUD INSERTION NOTE:    Indication: Long-term birth control    Benefits, alternatives and risks discussed with patient. Consent form signed.    Bimanual exam performed. Uterus is anteverted. Sterile speculum placed with good visualization of the cervix. Cervix cleansed with betadine three times. Anterior lip of cervix grasped with single-tooth tenaculum. Unfortunately, unable to pass sound through internal os. Attempted multiple times. Plan to have patient return, will pre-treat with cytotec.Patient tolerated procedure well without complication. After-care and return instructions given to patient.

## 2022-03-02 ENCOUNTER — HOSPITAL ENCOUNTER (OUTPATIENT)
Facility: MEDICAL CENTER | Age: 32
End: 2022-03-02
Attending: FAMILY MEDICINE
Payer: COMMERCIAL

## 2022-03-02 ENCOUNTER — OFFICE VISIT (OUTPATIENT)
Dept: MEDICAL GROUP | Facility: MEDICAL CENTER | Age: 32
End: 2022-03-02
Payer: COMMERCIAL

## 2022-03-02 VITALS
HEART RATE: 73 BPM | BODY MASS INDEX: 26.22 KG/M2 | SYSTOLIC BLOOD PRESSURE: 118 MMHG | WEIGHT: 163.14 LBS | RESPIRATION RATE: 16 BRPM | DIASTOLIC BLOOD PRESSURE: 88 MMHG | TEMPERATURE: 98.8 F | OXYGEN SATURATION: 95 % | HEIGHT: 66 IN

## 2022-03-02 DIAGNOSIS — Z30.433 ENCOUNTER FOR REMOVAL AND REINSERTION OF INTRAUTERINE CONTRACEPTIVE DEVICE (IUD): ICD-10-CM

## 2022-03-02 DIAGNOSIS — N88.9 CERVICAL LESION: ICD-10-CM

## 2022-03-02 LAB
INT CON NEG: NEGATIVE
INT CON POS: POSITIVE
POC URINE PREGNANCY TEST: NEGATIVE

## 2022-03-02 PROCEDURE — 99000 SPECIMEN HANDLING OFFICE-LAB: CPT | Performed by: FAMILY MEDICINE

## 2022-03-02 PROCEDURE — 87480 CANDIDA DNA DIR PROBE: CPT

## 2022-03-02 PROCEDURE — 87660 TRICHOMONAS VAGIN DIR PROBE: CPT

## 2022-03-02 PROCEDURE — 87070 CULTURE OTHR SPECIMN AEROBIC: CPT

## 2022-03-02 PROCEDURE — 88175 CYTOPATH C/V AUTO FLUID REDO: CPT

## 2022-03-02 PROCEDURE — 87624 HPV HI-RISK TYP POOLED RSLT: CPT

## 2022-03-02 PROCEDURE — 99213 OFFICE O/P EST LOW 20 MIN: CPT | Performed by: FAMILY MEDICINE

## 2022-03-02 PROCEDURE — 87510 GARDNER VAG DNA DIR PROBE: CPT

## 2022-03-02 PROCEDURE — 87205 SMEAR GRAM STAIN: CPT

## 2022-03-02 PROCEDURE — 81025 URINE PREGNANCY TEST: CPT | Performed by: FAMILY MEDICINE

## 2022-03-02 RX ORDER — LEVONORGESTREL AND ETHINYL ESTRADIOL 0.1-0.02MG
1 KIT ORAL DAILY
Qty: 28 TABLET | Refills: 11 | Status: SHIPPED | OUTPATIENT
Start: 2022-03-02

## 2022-03-03 DIAGNOSIS — N88.9 CERVICAL LESION: ICD-10-CM

## 2022-03-03 LAB
CANDIDA DNA VAG QL PROBE+SIG AMP: NEGATIVE
G VAGINALIS DNA VAG QL PROBE+SIG AMP: NEGATIVE
GRAM STN SPEC: NORMAL
SIGNIFICANT IND 70042: NORMAL
SITE SITE: NORMAL
SOURCE SOURCE: NORMAL
T VAGINALIS DNA VAG QL PROBE+SIG AMP: NEGATIVE

## 2022-03-03 NOTE — ASSESSMENT & PLAN NOTE
Fouzia presents for Mirena IUD insertion however 1.5cm round slightly green cervical lesion noted on 10 o'clock position of cervix. Notable odor on vaginal exam.  Given findings on exam, deferred placement, collected pap, vaginal pathogens and culture, urgent referral to gynecology placed.    Patient was seen by my colleague, Dr. Arshad, three days ago for IUD removal and insertion of new Mirena IUD. Unfortunately IUD unable to be inserted as Dr. Arshad unable to pass sound through internal os; patient was prescribed vaginal cytotec and rescheduled for insertion with me today.     Spoke with Dr. Arshad via telephone after patient's visit as Dr. Arshad is out of the office today. After discussion with Dr. Arshad I believe round greenish lesion on right side of cervix is location of tenaculum placement by Dr. Arshad. Dr. Arshad notes she had to apply significant silver nitrate to area to achieve hemostasis.

## 2022-03-03 NOTE — PROGRESS NOTES
Subjective:     CC: pt presents for IUD insertion    HPI:   Fouzia presents today with:    Cervical lesion  Fouzia presents for Mirena IUD insertion however 1.5cm round slightly green cervical lesion noted on 10 o'clock position of cervix. Notable malodor on vaginal exam.  Given findings on exam, deferred placement, collected pap, vaginal pathogens and culture, urgent referral to gynecology placed.    Patient was seen by my colleague, Dr. Arshad, three days ago for IUD removal and insertion of new Mirena IUD. Unfortunately IUD unable to be inserted as Dr. Arshad unable to pass sound through internal os; patient was prescribed vaginal cytotec and rescheduled for insertion with me today.     Spoke with Dr. Arshad via telephone after patient's visit as Dr. Arshad is out of the office today. After discussion with Dr. Arshad I believe round greenish lesion on right side of cervix is location of tenaculum placement by Dr. Arshad. Dr. Arshad notes she had to apply significant silver nitrate to area to achieve hemostasis.       Past Medical History:   Diagnosis Date   • Depression     had some depression situationally unemployed, soped, self-resolved   • Migraine     hx migraines (on imitrex) in her teens, resolved in her 20s       Social History     Tobacco Use   • Smoking status: Never Smoker   • Smokeless tobacco: Never Used   Vaping Use   • Vaping Use: Never used   Substance Use Topics   • Alcohol use: Yes     Comment: 5 days a week 2-3 beer per time.    • Drug use: No       Current Outpatient Medications Ordered in Epic   Medication Sig Dispense Refill   • levonorgestrel-ethinyl estradiol (AVIANE) 0.1-20 MG-MCG per tablet Take 1 Tablet by mouth every day. 28 Tablet 11   • sertraline (ZOLOFT) 25 MG tablet Take 1 Tablet by mouth every day. 90 Tablet 1   • sertraline (ZOLOFT) 50 MG Tab Take 1 Tablet by mouth every day. 30 Tablet 1   • Diclofenac Sodium 1 % Gel Apply  to skin as directed.     • Multiple Vitamin  "(MULTI-VITAMIN DAILY PO) Take  by mouth.     • levonorgestrel (MIRENA) 20 MCG/24HR IUD 1 Each by Intrauterine route Once. (Patient not taking: Reported on 3/2/2022)       No current [facility]-ordered facility-administered medications on file.       Allergies:  Patient has no known allergies.      Objective:       Exam:  /88 (BP Location: Left arm, Patient Position: Sitting, BP Cuff Size: Adult)   Pulse 73   Temp 37.1 °C (98.8 °F) (Temporal)   Resp 16   Ht 1.676 m (5' 6\")   Wt 74 kg (163 lb 2.3 oz)   SpO2 95%   BMI 26.33 kg/m²  Body mass index is 26.33 kg/m².    Gen: Alert and oriented, No apparent distress  Lungs: Normal effort, CTA bilaterally, no wheezes, rhonchi, or rales  CV: Regular rate and rhythm, no murmurs, rubs, or gallops  :      External genitalia and vaginal canal normal with no obvious lesions; 1.5cm round slightly green cervical lesion noted on 10 o'clock position of cervix. Notable malodor on vaginal exam.    Assessment & Plan:     31 y.o. female with the following -     1. Encounter for removal and reinsertion of intrauterine contraceptive device (IUD)  Did not insert IUD, see HPI for details. Patient will use COCs while awaiting IUD placement with gynecology.  - POCT Pregnancy  - Consent for all Surgical, Special Diagnostic or Therapeutic Procedures  - levonorgestrel-ethinyl estradiol (AVIANE) 0.1-20 MG-MCG per tablet; Take 1 Tablet by mouth every day.  Dispense: 28 Tablet; Refill: 11    2. Cervical lesion  See HPI for details; ultimately lesion most likely secondary to location of tenaculum placement and silver nitrate however given malodor and possible infection, wanted to avoid intrauterine infection and decided to defer IUD placement. Called patient to update s/p speaking with Dr. Arshad. Strict precautions given should patient experience any s/sx of infection.  - THINPREP PAP WITH HPV; Future  - VAGINAL PATHOGENS DNA PANEL; Future  - CULTURE WOUND W/ GRAM STAIN; Future  - Referral to " Gynecology    Please note this dictation was created using voice recognition software. I have made every reasonable attempt to correct obvious errors, but I expect there may be errors of grammar, and possibly content, that I did not discover before finalizing the note.

## 2022-03-06 LAB
BACTERIA WND AEROBE CULT: NORMAL
GRAM STN SPEC: NORMAL
SIGNIFICANT IND 70042: NORMAL
SITE SITE: NORMAL
SOURCE SOURCE: NORMAL

## 2022-06-24 ENCOUNTER — HOSPITAL ENCOUNTER (OUTPATIENT)
Dept: LAB | Facility: MEDICAL CENTER | Age: 32
End: 2022-06-24
Attending: PHYSICIAN ASSISTANT
Payer: COMMERCIAL

## 2022-06-24 DIAGNOSIS — Z00.00 PREVENTATIVE HEALTH CARE: ICD-10-CM

## 2022-06-24 LAB
25(OH)D3 SERPL-MCNC: 48 NG/ML (ref 30–100)
ALBUMIN SERPL BCP-MCNC: 4.8 G/DL (ref 3.2–4.9)
ALBUMIN/GLOB SERPL: 1.8 G/DL
ALP SERPL-CCNC: 48 U/L (ref 30–99)
ALT SERPL-CCNC: 19 U/L (ref 2–50)
ANION GAP SERPL CALC-SCNC: 10 MMOL/L (ref 7–16)
AST SERPL-CCNC: 20 U/L (ref 12–45)
BASOPHILS # BLD AUTO: 0.7 % (ref 0–1.8)
BASOPHILS # BLD: 0.03 K/UL (ref 0–0.12)
BILIRUB SERPL-MCNC: 0.4 MG/DL (ref 0.1–1.5)
BUN SERPL-MCNC: 10 MG/DL (ref 8–22)
CALCIUM SERPL-MCNC: 9.6 MG/DL (ref 8.5–10.5)
CHLORIDE SERPL-SCNC: 101 MMOL/L (ref 96–112)
CHOLEST SERPL-MCNC: 185 MG/DL (ref 100–199)
CO2 SERPL-SCNC: 24 MMOL/L (ref 20–33)
CREAT SERPL-MCNC: 0.69 MG/DL (ref 0.5–1.4)
EOSINOPHIL # BLD AUTO: 0.17 K/UL (ref 0–0.51)
EOSINOPHIL NFR BLD: 3.9 % (ref 0–6.9)
ERYTHROCYTE [DISTWIDTH] IN BLOOD BY AUTOMATED COUNT: 40.7 FL (ref 35.9–50)
FASTING STATUS PATIENT QL REPORTED: NORMAL
GFR SERPLBLD CREATININE-BSD FMLA CKD-EPI: 117 ML/MIN/1.73 M 2
GLOBULIN SER CALC-MCNC: 2.6 G/DL (ref 1.9–3.5)
GLUCOSE SERPL-MCNC: 82 MG/DL (ref 65–99)
HCT VFR BLD AUTO: 45 % (ref 37–47)
HDLC SERPL-MCNC: 58 MG/DL
HGB BLD-MCNC: 15.3 G/DL (ref 12–16)
IMM GRANULOCYTES # BLD AUTO: 0 K/UL (ref 0–0.11)
IMM GRANULOCYTES NFR BLD AUTO: 0 % (ref 0–0.9)
LDLC SERPL CALC-MCNC: 116 MG/DL
LYMPHOCYTES # BLD AUTO: 1.53 K/UL (ref 1–4.8)
LYMPHOCYTES NFR BLD: 35.4 % (ref 22–41)
MCH RBC QN AUTO: 31.6 PG (ref 27–33)
MCHC RBC AUTO-ENTMCNC: 34 G/DL (ref 33.6–35)
MCV RBC AUTO: 93 FL (ref 81.4–97.8)
MONOCYTES # BLD AUTO: 0.43 K/UL (ref 0–0.85)
MONOCYTES NFR BLD AUTO: 10 % (ref 0–13.4)
NEUTROPHILS # BLD AUTO: 2.16 K/UL (ref 2–7.15)
NEUTROPHILS NFR BLD: 50 % (ref 44–72)
NRBC # BLD AUTO: 0 K/UL
NRBC BLD-RTO: 0 /100 WBC
PLATELET # BLD AUTO: 184 K/UL (ref 164–446)
PMV BLD AUTO: 10.3 FL (ref 9–12.9)
POTASSIUM SERPL-SCNC: 4.6 MMOL/L (ref 3.6–5.5)
PROT SERPL-MCNC: 7.4 G/DL (ref 6–8.2)
RBC # BLD AUTO: 4.84 M/UL (ref 4.2–5.4)
SODIUM SERPL-SCNC: 135 MMOL/L (ref 135–145)
TRIGL SERPL-MCNC: 53 MG/DL (ref 0–149)
TSH SERPL DL<=0.005 MIU/L-ACNC: 2.89 UIU/ML (ref 0.38–5.33)
WBC # BLD AUTO: 4.3 K/UL (ref 4.8–10.8)

## 2022-06-24 PROCEDURE — 80061 LIPID PANEL: CPT

## 2022-06-24 PROCEDURE — 36415 COLL VENOUS BLD VENIPUNCTURE: CPT

## 2022-06-24 PROCEDURE — 84443 ASSAY THYROID STIM HORMONE: CPT

## 2022-06-24 PROCEDURE — 82306 VITAMIN D 25 HYDROXY: CPT

## 2022-06-24 PROCEDURE — 80053 COMPREHEN METABOLIC PANEL: CPT

## 2022-06-24 PROCEDURE — 85025 COMPLETE CBC W/AUTO DIFF WBC: CPT

## 2022-07-27 DIAGNOSIS — F32.5 MAJOR DEPRESSIVE DISORDER WITH SINGLE EPISODE, IN FULL REMISSION (HCC): ICD-10-CM

## 2022-07-28 RX ORDER — SERTRALINE HYDROCHLORIDE 25 MG/1
TABLET, FILM COATED ORAL
Qty: 90 TABLET | Refills: 1 | Status: SHIPPED | OUTPATIENT
Start: 2022-07-28 | End: 2022-09-28 | Stop reason: SDUPTHER

## 2022-09-12 DIAGNOSIS — F32.5 MAJOR DEPRESSIVE DISORDER WITH SINGLE EPISODE, IN FULL REMISSION (HCC): ICD-10-CM

## 2022-09-13 DIAGNOSIS — F32.5 MAJOR DEPRESSIVE DISORDER WITH SINGLE EPISODE, IN FULL REMISSION (HCC): ICD-10-CM

## 2022-09-13 NOTE — TELEPHONE ENCOUNTER
Received request via: Patient    Was the patient seen in the last year in this department? Yes    Does the patient have an active prescription (recently filled or refills available) for medication(s) requested? No    Future Appointments         Provider Department Center    9/28/2022 10:20 AM (Arrive by 10:05 AM) Zoila Allred P.A.-C. [facility]

## 2022-09-28 ENCOUNTER — TELEMEDICINE (OUTPATIENT)
Dept: MEDICAL GROUP | Facility: MEDICAL CENTER | Age: 32
End: 2022-09-28
Payer: COMMERCIAL

## 2022-09-28 VITALS — BODY MASS INDEX: 25.71 KG/M2 | WEIGHT: 160 LBS | HEIGHT: 66 IN

## 2022-09-28 DIAGNOSIS — F32.5 MAJOR DEPRESSIVE DISORDER WITH SINGLE EPISODE, IN FULL REMISSION (HCC): ICD-10-CM

## 2022-09-28 PROCEDURE — 99213 OFFICE O/P EST LOW 20 MIN: CPT | Mod: 95 | Performed by: PHYSICIAN ASSISTANT

## 2022-09-28 RX ORDER — SERTRALINE HYDROCHLORIDE 25 MG/1
25 TABLET, FILM COATED ORAL DAILY
Qty: 90 TABLET | Refills: 1 | Status: SHIPPED | OUTPATIENT
Start: 2022-09-28 | End: 2023-03-24

## 2022-09-28 ASSESSMENT — FIBROSIS 4 INDEX: FIB4 SCORE: 0.85

## 2022-09-28 NOTE — PROGRESS NOTES
"This visit was conducted utilizing secure and encrypted videoconferencing equipment, with the patient's consent.    Patient's identity was verified.          Chief Complaint   Patient presents with    Depression     F/v meds     Blood Pressure Problem       HPI:     Fouzia Saldana is a 32 y.o. female. Patient is presenting to discuss: Refill on Zoloft.  Patient has been continuing on Zoloft 75 Mg daily.  States couple years ago tried to go down to 50 and she definitely had symptoms, mood swings.            Past medical, surgical, family, and social history is reviewed in [facility] chart by me today.   Medications and allergies reviewed and updated in Epic chart by me today.          Objective:     Ht 1.676 m (5' 6\")   Wt 72.6 kg (160 lb)  Body mass index is 25.82 kg/m².   Physical Exam:  Pain: sounds comfortable   Constitutional: Alert, no distress.  Eye: pupils Equal, conjunctiva clear,   Respiratory: Unlabored respiratory effort, speaking in full sentences, no audible wheezes.           Assessment and Plan:   The following treatment plan was discussed      1. Major depressive disorder with single episode, in full remission (HCC)  No SE, no SI HI  - sertraline (ZOLOFT) 25 MG tablet; Take 1 Tablet by mouth every day.  Dispense: 90 Tablet; Refill: 1  - sertraline (ZOLOFT) 50 MG Tab; Take 1 Tablet by mouth every day.  Dispense: 90 Tablet; Refill: 1      On a separate note she was seen by OB/GYN and was able to get IUD without any problem.        F/U 6 months                "

## 2023-03-21 DIAGNOSIS — F32.5 MAJOR DEPRESSIVE DISORDER WITH SINGLE EPISODE, IN FULL REMISSION (HCC): ICD-10-CM

## 2023-03-24 RX ORDER — SERTRALINE HYDROCHLORIDE 25 MG/1
TABLET, FILM COATED ORAL
Qty: 90 TABLET | Refills: 1 | Status: SHIPPED | OUTPATIENT
Start: 2023-03-24 | End: 2023-10-11 | Stop reason: SDUPTHER

## 2023-10-10 ENCOUNTER — PATIENT MESSAGE (OUTPATIENT)
Dept: MEDICAL GROUP | Facility: MEDICAL CENTER | Age: 33
End: 2023-10-10
Payer: COMMERCIAL

## 2023-10-10 DIAGNOSIS — F32.5 MAJOR DEPRESSIVE DISORDER WITH SINGLE EPISODE, IN FULL REMISSION (HCC): ICD-10-CM

## 2023-10-10 RX ORDER — SERTRALINE HYDROCHLORIDE 25 MG/1
25 TABLET, FILM COATED ORAL
Qty: 90 TABLET | Refills: 1 | Status: CANCELLED | OUTPATIENT
Start: 2023-10-10

## 2023-10-11 RX ORDER — SERTRALINE HYDROCHLORIDE 25 MG/1
25 TABLET, FILM COATED ORAL
Qty: 90 TABLET | Refills: 0 | Status: SHIPPED | OUTPATIENT
Start: 2023-10-11 | End: 2023-11-14 | Stop reason: SDUPTHER

## 2023-10-11 NOTE — TELEPHONE ENCOUNTER
Future Appointments         Provider Department Center    11/14/2023 11:00 AM (Arrive by 10:45 AM) Zoila Allred P.A.-C. [facility]

## 2023-11-14 ENCOUNTER — TELEMEDICINE (OUTPATIENT)
Dept: MEDICAL GROUP | Facility: MEDICAL CENTER | Age: 33
End: 2023-11-14
Payer: COMMERCIAL

## 2023-11-14 DIAGNOSIS — F32.1 CURRENT MODERATE EPISODE OF MAJOR DEPRESSIVE DISORDER WITHOUT PRIOR EPISODE (HCC): ICD-10-CM

## 2023-11-14 DIAGNOSIS — F32.5 MAJOR DEPRESSIVE DISORDER WITH SINGLE EPISODE, IN FULL REMISSION (HCC): ICD-10-CM

## 2023-11-14 DIAGNOSIS — Z11.4 ENCOUNTER FOR SCREENING FOR HIV: ICD-10-CM

## 2023-11-14 DIAGNOSIS — Z11.59 NEED FOR HEPATITIS C SCREENING TEST: ICD-10-CM

## 2023-11-14 DIAGNOSIS — Z00.00 PREVENTATIVE HEALTH CARE: ICD-10-CM

## 2023-11-14 PROBLEM — Z30.433 ENCOUNTER FOR REMOVAL AND REINSERTION OF INTRAUTERINE CONTRACEPTIVE DEVICE (IUD): Status: RESOLVED | Noted: 2021-12-08 | Resolved: 2023-11-14

## 2023-11-14 PROCEDURE — 99213 OFFICE O/P EST LOW 20 MIN: CPT | Mod: 95 | Performed by: PHYSICIAN ASSISTANT

## 2023-11-14 RX ORDER — SERTRALINE HYDROCHLORIDE 25 MG/1
25 TABLET, FILM COATED ORAL
Qty: 90 TABLET | Refills: 1 | Status: SHIPPED | OUTPATIENT
Start: 2023-11-14 | End: 2024-01-23

## 2023-11-14 NOTE — PROGRESS NOTES
This visit was conducted via Zoom using secure and encrypted videoconferencing technology.   The patient was in their home in the [facility].    The patient's identity was confirmed and verbal consent was obtained for this virtual visit.     Patient's identity was verified.          Chief Complaint   Patient presents with    Depression     Follow up       HPI:     Fouzia Saldana is a 33 y.o. female. Patient is presenting to discuss: f/u on depression    On Zoloft 75 mg in the morning for depressive symptoms.  Doing well symptoms well controlled, sad about his family including sleep disturbance, fatigue tiredness, decreased libido increased appetite.  No SI HI        Past medical, surgical, family, and social history is reviewed in Epic chart by me today.   Medications and allergies reviewed and updated in Epic chart by me today.          Objective:     There were no vitals taken for this visit. There is no height or weight on file to calculate BMI.   Physical Exam:  Pain: sounds comfortable   Constitutional: Alert, no distress.  Eye: pupils Equal, conjunctiva clear,   Respiratory: Unlabored respiratory effort, speaking in full sentences, no audible wheezes.           Assessment and Plan:   The following treatment plan was discussed      1. Need for hepatitis C screening test    - HEP C VIRUS ANTIBODY    2. Encounter for screening for HIV    - HIV AG/AB COMBO ASSAY SCREENING; Future    3. Preventative health care    - Comp Metabolic Panel; Future  - CBC WITH DIFFERENTIAL; Future  - Lipid Profile; Future  - TSH WITH REFLEX TO FT4; Future    4. Current moderate episode of major depressive disorder without prior episode (HCC)      5. Major depressive disorder with single episode, in full remission (HCC)    - sertraline (ZOLOFT) 25 MG tablet; Take 1 Tablet by mouth every day.  Dispense: 90 Tablet; Refill: 1  - sertraline (ZOLOFT) 50 MG Tab; Take 1 Tablet by mouth every day.  Dispense: 90 Tablet; Refill:  1              F/U 6 months for annual exam

## 2023-12-20 DIAGNOSIS — F32.5 MAJOR DEPRESSIVE DISORDER WITH SINGLE EPISODE, IN FULL REMISSION (HCC): ICD-10-CM

## 2024-01-18 DIAGNOSIS — F32.5 MAJOR DEPRESSIVE DISORDER WITH SINGLE EPISODE, IN FULL REMISSION (HCC): ICD-10-CM

## 2024-01-23 RX ORDER — SERTRALINE HYDROCHLORIDE 25 MG/1
25 TABLET, FILM COATED ORAL
Qty: 90 TABLET | Refills: 0 | Status: SHIPPED | OUTPATIENT
Start: 2024-01-23

## 2024-04-22 DIAGNOSIS — F32.5 MAJOR DEPRESSIVE DISORDER WITH SINGLE EPISODE, IN FULL REMISSION (HCC): ICD-10-CM

## 2024-04-23 RX ORDER — SERTRALINE HYDROCHLORIDE 25 MG/1
25 TABLET, FILM COATED ORAL
Qty: 90 TABLET | Refills: 0 | Status: SHIPPED | OUTPATIENT
Start: 2024-04-23

## 2024-06-19 DIAGNOSIS — F32.5 MAJOR DEPRESSIVE DISORDER WITH SINGLE EPISODE, IN FULL REMISSION (HCC): ICD-10-CM

## 2024-07-09 DIAGNOSIS — F32.5 MAJOR DEPRESSIVE DISORDER WITH SINGLE EPISODE, IN FULL REMISSION (HCC): ICD-10-CM

## 2024-07-11 RX ORDER — SERTRALINE HYDROCHLORIDE 25 MG/1
25 TABLET, FILM COATED ORAL
Qty: 90 TABLET | Refills: 0 | Status: SHIPPED | OUTPATIENT
Start: 2024-07-11

## 2024-07-23 DIAGNOSIS — F32.5 MAJOR DEPRESSIVE DISORDER WITH SINGLE EPISODE, IN FULL REMISSION (HCC): ICD-10-CM

## 2024-08-27 DIAGNOSIS — F32.5 MAJOR DEPRESSIVE DISORDER WITH SINGLE EPISODE, IN FULL REMISSION (HCC): ICD-10-CM

## 2024-10-01 DIAGNOSIS — F32.5 MAJOR DEPRESSIVE DISORDER WITH SINGLE EPISODE, IN FULL REMISSION (HCC): ICD-10-CM

## 2024-10-15 DIAGNOSIS — F32.5 MAJOR DEPRESSIVE DISORDER WITH SINGLE EPISODE, IN FULL REMISSION (HCC): ICD-10-CM

## 2024-10-15 RX ORDER — SERTRALINE HYDROCHLORIDE 25 MG/1
25 TABLET, FILM COATED ORAL
Qty: 90 TABLET | Refills: 0 | Status: SHIPPED | OUTPATIENT
Start: 2024-10-15

## 2024-11-24 NOTE — TELEPHONE ENCOUNTER
Was the patient seen in the last year in this department? Yes   Does patient have an active prescription for medications requested? No   Received Request Via: Pharmacy       [employer]

## 2024-12-06 DIAGNOSIS — F32.5 MAJOR DEPRESSIVE DISORDER WITH SINGLE EPISODE, IN FULL REMISSION (HCC): ICD-10-CM

## 2024-12-09 NOTE — TELEPHONE ENCOUNTER
Was the patient seen in the last year in this department? No    Does patient have an active prescription for medications requested? No   Received Request Via: Pharmacy  Patient has an appointment on 12/11

## 2024-12-11 ENCOUNTER — APPOINTMENT (OUTPATIENT)
Dept: MEDICAL GROUP | Facility: MEDICAL CENTER | Age: 34
End: 2024-12-11

## 2024-12-11 VITALS
WEIGHT: 166.9 LBS | HEART RATE: 88 BPM | OXYGEN SATURATION: 97 % | SYSTOLIC BLOOD PRESSURE: 124 MMHG | TEMPERATURE: 97.2 F | DIASTOLIC BLOOD PRESSURE: 80 MMHG | BODY MASS INDEX: 26.82 KG/M2 | HEIGHT: 66 IN

## 2024-12-11 DIAGNOSIS — Z00.00 WELL ADULT EXAM: ICD-10-CM

## 2024-12-11 DIAGNOSIS — Z00.00 PREVENTATIVE HEALTH CARE: ICD-10-CM

## 2024-12-11 PROCEDURE — 3074F SYST BP LT 130 MM HG: CPT | Performed by: PHYSICIAN ASSISTANT

## 2024-12-11 PROCEDURE — 99395 PREV VISIT EST AGE 18-39: CPT | Performed by: PHYSICIAN ASSISTANT

## 2024-12-11 PROCEDURE — 3079F DIAST BP 80-89 MM HG: CPT | Performed by: PHYSICIAN ASSISTANT

## 2024-12-11 ASSESSMENT — PATIENT HEALTH QUESTIONNAIRE - PHQ9
5. POOR APPETITE OR OVEREATING: NOT AT ALL
7. TROUBLE CONCENTRATING ON THINGS, SUCH AS READING THE NEWSPAPER OR WATCHING TELEVISION: NOT AT ALL
1. LITTLE INTEREST OR PLEASURE IN DOING THINGS: NOT AT ALL
3. TROUBLE FALLING OR STAYING ASLEEP OR SLEEPING TOO MUCH: NOT AT ALL
6. FEELING BAD ABOUT YOURSELF - OR THAT YOU ARE A FAILURE OR HAVE LET YOURSELF OR YOUR FAMILY DOWN: NOT AL ALL
SUM OF ALL RESPONSES TO PHQ9 QUESTIONS 1 AND 2: 0
4. FEELING TIRED OR HAVING LITTLE ENERGY: NOT AT ALL
8. MOVING OR SPEAKING SO SLOWLY THAT OTHER PEOPLE COULD HAVE NOTICED. OR THE OPPOSITE, BEING SO FIGETY OR RESTLESS THAT YOU HAVE BEEN MOVING AROUND A LOT MORE THAN USUAL: NOT AT ALL
2. FEELING DOWN, DEPRESSED, IRRITABLE, OR HOPELESS: NOT AT ALL
9. THOUGHTS THAT YOU WOULD BE BETTER OFF DEAD, OR OF HURTING YOURSELF: NOT AT ALL
SUM OF ALL RESPONSES TO PHQ QUESTIONS 1-9: 0

## 2024-12-11 NOTE — PROGRESS NOTES
Subjective:     CC:   Chief Complaint   Patient presents with    Annual Exam       HPI:   Fouzia Saldana is a 34 y.o. female who presents for annual exam    Pt has GYN provider: yes  HAs mirena IUD in place, no longer get menses,    No breast tenderness, mass, nipple discharge, changes in size or contour, or abnormal cyclic discomfort.    Regular exercise: no   Diet: eats well, has variety, describes it as healthy     She  has a past medical history of Depression and Migraine.    She has no past medical history of Hypertension.  She  has a past surgical history that includes dental extraction(s).    Family History   Problem Relation Age of Onset    Cancer Maternal Grandmother 75        bone ca    Lung Disease Maternal Grandmother         copd/smoker    Cancer Paternal Grandfather         thinks colon ca    Thyroid Mother         hypo    Hyperlipidemia Mother     Hypertension Mother     Lung Disease Maternal Grandfather         emyphsema/copd.smoker    Lung Disease Paternal Grandmother         asthma    Heart Attack Paternal Grandmother         minor silent MI       Social History     Socioeconomic History    Marital status:      Spouse name: Not on file    Number of children: Not on file    Years of education: Not on file    Highest education level: Not on file   Occupational History    Occupation: works in office   Tobacco Use    Smoking status: Never    Smokeless tobacco: Never   Vaping Use    Vaping status: Never Used   Substance and Sexual Activity    Alcohol use: Yes     Comment: 5 days a week 2-3 beer per time.     Drug use: No    Sexual activity: Yes     Partners: Male     Birth control/protection: I.U.D.     Comment: . rebecca: assistant     Other Topics Concern    Not on file   Social History Narrative    Not on file     Social Drivers of Health     Financial Resource Strain: Not on file   Food Insecurity: Not on file   Transportation Needs: Not on file   Physical Activity: Not on  "file   Stress: Not on file   Social Connections: Not on file   Intimate Partner Violence: Not on file   Housing Stability: Not on file       Patient Active Problem List    Diagnosis Date Noted    Lesion of cervix 03/02/2022    Atypical endocervical cells on cervical Papanicolaou smear 07/28/2021    Chronic pain of right knee 07/28/2021    Current moderate episode of major depressive disorder without prior episode (HCC) 08/16/2019    IUD (intrauterine device) in place 03/10/2016         Current Outpatient Medications   Medication Sig Dispense Refill    sertraline (ZOLOFT) 50 MG Tab TAKE ONE TABLET BY MOUTH EVERY DAY 90 Tablet 0    sertraline (ZOLOFT) 25 MG tablet Take 1 Tablet by mouth every day. 90 Tablet 0    Diclofenac Sodium 1 % Gel Apply  to skin as directed.      Multiple Vitamin (MULTI-VITAMIN DAILY PO) Take  by mouth.       No current facility-administered medications for this visit.     No Known Allergies    Review of Systems   Constitutional: Negative for fever, chills and malaise/fatigue.   HENT: Negative for congestion.    Eyes: Negative for pain.   Respiratory: Negative for cough and shortness of breath.    Cardiovascular: Negative for leg swelling.   Gastrointestinal: Negative for nausea, vomiting, abdominal pain and diarrhea.   Genitourinary: Negative for dysuria and hematuria.   Skin: Negative for rash.   Neurological: Negative for dizziness, focal weakness and headaches.   Endo/Heme/Allergies: Does not bruise/bleed easily.   Psychiatric/Behavioral: Negative for depression.  The patient is not nervous/anxious.      Objective:     /80 (BP Location: Right arm, Patient Position: Sitting, BP Cuff Size: Adult)   Pulse 88   Temp 36.2 °C (97.2 °F) (Temporal)   Ht 1.676 m (5' 6\")   Wt 75.7 kg (166 lb 14.4 oz)   SpO2 97%   BMI 26.94 kg/m²   Body mass index is 26.94 kg/m².  Wt Readings from Last 4 Encounters:   12/11/24 75.7 kg (166 lb 14.4 oz)   09/28/22 72.6 kg (160 lb)   03/02/22 74 kg (163 lb " 2.3 oz)   02/28/22 73 kg (161 lb)       Physical Exam:  Constitutional: Well-developed and well-nourished. No distress.   Skin: Skin is warm and dry. No rashes in visible areas.  Nail: w/o pitting, ridging or onychomycosis   Head: Atraumatic without lesions.  Eyes: Equal, round and reactive, conjunctiva clear,sclera non icteric, lids normal.  Ears:  External ears unremarkable. Tympanic membranes clear and intact.  Nose: Nares patent. Septum midline. Turbinates without erythema nor edema. No discharge.    Mouth/Throat: Dentition is good. Tongue normal. Oropharynx is clear and moist. Posterior pharynx without erythema or exudates.  Neck: Supple, trachea midline.  No thyromegaly present. No lymphadenopathy--cervical or supraclavicular  Cardiovascular: Regular rate and rhythm, without any murmurs.  No lower extremity edema.   Lung:  Clear to auscultation throughout w/o any wheeze or rhonchi. No adventitious sounds.    Abdomen: Soft, non tender, and without distention.  Musculoskeletal: All major joints without pain or swelling  Neurological: speech normal, mental status intact, gait grossly normal  Psychiatric:   Alert and oriented x3, normal affect and mood and behavior      Assessment and Plan:     1. Preventative health care  Comp Metabolic Panel    CBC WITH DIFFERENTIAL    Lipid Profile    TSH WITH REFLEX TO FT4    VITAMIN D,25 HYDROXY (DEFICIENCY)      2. Well adult exam            HCM:       Pt has had some weight gain,   Advised regular exercise   healthy diet  Sun protection w SPF  Safety belts  Biannual dentis visit   Mammogram: start at 38  Last lab results were reviewed by me today   Labs per orders    Follow-up: Return if symptoms worsen or fail to improve.

## 2025-01-14 ENCOUNTER — HOSPITAL ENCOUNTER (OUTPATIENT)
Dept: LAB | Facility: MEDICAL CENTER | Age: 35
End: 2025-01-14
Attending: PHYSICIAN ASSISTANT
Payer: COMMERCIAL

## 2025-01-14 DIAGNOSIS — Z00.00 PREVENTATIVE HEALTH CARE: ICD-10-CM

## 2025-01-14 LAB
25(OH)D3 SERPL-MCNC: 37 NG/ML (ref 30–100)
ALBUMIN SERPL BCP-MCNC: 4.7 G/DL (ref 3.2–4.9)
ALBUMIN/GLOB SERPL: 1.8 G/DL
ALP SERPL-CCNC: 47 U/L (ref 30–99)
ALT SERPL-CCNC: 16 U/L (ref 2–50)
ANION GAP SERPL CALC-SCNC: 12 MMOL/L (ref 7–16)
AST SERPL-CCNC: 18 U/L (ref 12–45)
BASOPHILS # BLD AUTO: 0.6 % (ref 0–1.8)
BASOPHILS # BLD: 0.03 K/UL (ref 0–0.12)
BILIRUB SERPL-MCNC: 0.2 MG/DL (ref 0.1–1.5)
BUN SERPL-MCNC: 11 MG/DL (ref 8–22)
CALCIUM ALBUM COR SERPL-MCNC: 9.2 MG/DL (ref 8.5–10.5)
CALCIUM SERPL-MCNC: 9.8 MG/DL (ref 8.5–10.5)
CHLORIDE SERPL-SCNC: 104 MMOL/L (ref 96–112)
CHOLEST SERPL-MCNC: 181 MG/DL (ref 100–199)
CO2 SERPL-SCNC: 24 MMOL/L (ref 20–33)
CREAT SERPL-MCNC: 0.69 MG/DL (ref 0.5–1.4)
EOSINOPHIL # BLD AUTO: 0.11 K/UL (ref 0–0.51)
EOSINOPHIL NFR BLD: 2.3 % (ref 0–6.9)
ERYTHROCYTE [DISTWIDTH] IN BLOOD BY AUTOMATED COUNT: 40.7 FL (ref 35.9–50)
FASTING STATUS PATIENT QL REPORTED: NORMAL
GFR SERPLBLD CREATININE-BSD FMLA CKD-EPI: 115 ML/MIN/1.73 M 2
GLOBULIN SER CALC-MCNC: 2.6 G/DL (ref 1.9–3.5)
GLUCOSE SERPL-MCNC: 89 MG/DL (ref 65–99)
HCT VFR BLD AUTO: 46 % (ref 37–47)
HDLC SERPL-MCNC: 55 MG/DL
HGB BLD-MCNC: 15.6 G/DL (ref 12–16)
IMM GRANULOCYTES # BLD AUTO: 0 K/UL (ref 0–0.11)
IMM GRANULOCYTES NFR BLD AUTO: 0 % (ref 0–0.9)
LDLC SERPL CALC-MCNC: 118 MG/DL
LYMPHOCYTES # BLD AUTO: 1.54 K/UL (ref 1–4.8)
LYMPHOCYTES NFR BLD: 31.6 % (ref 22–41)
MCH RBC QN AUTO: 32.2 PG (ref 27–33)
MCHC RBC AUTO-ENTMCNC: 33.9 G/DL (ref 32.2–35.5)
MCV RBC AUTO: 94.8 FL (ref 81.4–97.8)
MONOCYTES # BLD AUTO: 0.6 K/UL (ref 0–0.85)
MONOCYTES NFR BLD AUTO: 12.3 % (ref 0–13.4)
NEUTROPHILS # BLD AUTO: 2.6 K/UL (ref 1.82–7.42)
NEUTROPHILS NFR BLD: 53.2 % (ref 44–72)
NRBC # BLD AUTO: 0 K/UL
NRBC BLD-RTO: 0 /100 WBC (ref 0–0.2)
PLATELET # BLD AUTO: 218 K/UL (ref 164–446)
PMV BLD AUTO: 10.2 FL (ref 9–12.9)
POTASSIUM SERPL-SCNC: 4.7 MMOL/L (ref 3.6–5.5)
PROT SERPL-MCNC: 7.3 G/DL (ref 6–8.2)
RBC # BLD AUTO: 4.85 M/UL (ref 4.2–5.4)
SODIUM SERPL-SCNC: 140 MMOL/L (ref 135–145)
TRIGL SERPL-MCNC: 38 MG/DL (ref 0–149)
TSH SERPL DL<=0.005 MIU/L-ACNC: 2.75 UIU/ML (ref 0.38–5.33)
WBC # BLD AUTO: 4.9 K/UL (ref 4.8–10.8)

## 2025-01-14 PROCEDURE — 85025 COMPLETE CBC W/AUTO DIFF WBC: CPT

## 2025-01-14 PROCEDURE — 80053 COMPREHEN METABOLIC PANEL: CPT

## 2025-01-14 PROCEDURE — 84443 ASSAY THYROID STIM HORMONE: CPT

## 2025-01-14 PROCEDURE — 80061 LIPID PANEL: CPT

## 2025-01-14 PROCEDURE — 36415 COLL VENOUS BLD VENIPUNCTURE: CPT

## 2025-01-14 PROCEDURE — 82306 VITAMIN D 25 HYDROXY: CPT

## 2025-03-20 DIAGNOSIS — F32.5 MAJOR DEPRESSIVE DISORDER WITH SINGLE EPISODE, IN FULL REMISSION (HCC): ICD-10-CM

## 2025-07-11 DIAGNOSIS — F32.5 MAJOR DEPRESSIVE DISORDER WITH SINGLE EPISODE, IN FULL REMISSION (HCC): ICD-10-CM
